# Patient Record
Sex: FEMALE | Race: WHITE | Employment: FULL TIME | URBAN - METROPOLITAN AREA
[De-identification: names, ages, dates, MRNs, and addresses within clinical notes are randomized per-mention and may not be internally consistent; named-entity substitution may affect disease eponyms.]

---

## 2017-11-21 PROBLEM — F90.0 ATTENTION DEFICIT HYPERACTIVITY DISORDER, INATTENTIVE TYPE: Status: ACTIVE | Noted: 2017-11-21

## 2020-08-21 ENCOUNTER — HOSPITAL ENCOUNTER (OUTPATIENT)
Dept: PHYSICAL THERAPY | Age: 25
Discharge: HOME | End: 2020-08-21
Payer: COMMERCIAL

## 2020-08-21 DIAGNOSIS — M54.50 ACUTE BILATERAL LOW BACK PAIN WITHOUT SCIATICA: ICD-10-CM

## 2020-08-21 PROCEDURE — 97162 PT EVAL MOD COMPLEX 30 MIN: CPT

## 2020-08-21 PROCEDURE — 97110 THERAPEUTIC EXERCISES: CPT

## 2020-08-21 NOTE — PROGRESS NOTES
Harjukuja 9  : 1995  Payor: Una Porras / [place]UE E[place]ENTIAL[place] MANISHA / Product Type: MANISHA /  Delfin Rape at [address]. [address] [place] [address], [facility], [address] [place] [address] Road  [phone]   [phone]                                                          Bushra Nascimento MD      OUTPATIENT PHYSICAL THERAPY: Daily Treatment Note 2020 Visit Count:  1    Tx Diagnosis:  Cervicalgia (M54.2)  Radiculopathy, Cervical Region (M54.12)  Pain in left hand (X91.290)  Low back pain (M54.5)  Radiculopathy, Lumbar Region (M54.16)  Sciatica, right side (M54.31)        Pre-treatment Symptoms/Complaints:  See Initial Eval Dated 20 for more details. Pain: Initial:4/10 Post Session: 0/10   Medications Last Reviewed:  2020     Updated Objective Findings: See Initial Eval for more details. TREATMENT:   THERAPEUTIC EXERCISE: (25 minutes):  Exercises per grid below to improve mobility, strength and balance. Required minimal visual, verbal and manual cues to promote proper body alignment and promote proper body posture. Progressed resistance and complexity of movement as indicated. Date:  2020 Date:   Date:     Activity/Exercise Parameters Parameters Parameters   Education HEP, POC, PT goals, anatomy/pathology. On whiplash, Pain Science, Pain sensitivity, 5 components of chronic pain, Graded exposure, Sleep hygiene, Nutrition, Water intake Aerobic exercise, Resistance training. Heart Rate intervals Deep breathing     Walk/jog interval training 12 min( 1 min Run 1min Walk 1R 1W 1R) two sets                                         THERAPEUTIC ACTIVITY: ( 0 minutes): Activities per gid below to improve functional movement related mobility, strength and balance to improve neuro-muscular carryover to daily functional activities for improving patient's quality of life.  Required visual, verbal and manual cues to promote proper body alignment and promote proper body posture/mechanics. Progressed resistance and complexity of movement as indicated. Date:  8/21/2020 Date:   Date:     Activity/Exercise Parameters Parameters Parameters                                                                               MANUAL THERAPY: (0 minutes): Joint mobilization, Soft tissue mobilization was utilized and necessary because of the patient's restricted joint motion and restricted motion of soft tissue mobility. Date  8/21/2020    Technique Used Grade  Level # Time(s) Effect while being performed                                                                 HEP Log Date 1.    8/21/2020   2.  8/21/2020   3. 8/21/2020   4.    5.           [employer] Portal  Treatment/Session Summary:    Response to Treatment: Pt demonstrated understanding of POC and initial HEP. No increase in pain or adverse reactions. Communication/Consultation:  POC, HEP, PT goals, Faxed initial evaluation to MD.   Equipment provided today: HEP Handout     Recommendations/Intent for next treatment session:   Next visit will focus on Flexion-based Exercises Extension-Based Exercises (EOTA) Manual Therapy Core Stability Hip strengthening soft tissue mobilization. Treatment Plan of Care Effective Dates: 8/21/2020 TO 10/20/2020 (60 days).   Frequency/Duration: 2 times a week for 60 Days             Total Treatment Billable Duration:   25  Rx plus Eval   PT Patient Time In/Time Out  Time In: 1325  Time Out: Waqas Sheriff [address] Miguel Ángel Vizcaino PT    Future Appointments   Date Time Provider Kassie House   9/10/2020 10:45 AM Yisel Rebolledo MD [facility]
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
## 2020-08-31 ENCOUNTER — HOSPITAL ENCOUNTER (OUTPATIENT)
Dept: PHYSICAL THERAPY | Age: 25
Discharge: HOME | End: 2020-08-31
Payer: COMMERCIAL

## 2020-08-31 PROCEDURE — 97110 THERAPEUTIC EXERCISES: CPT

## 2020-08-31 NOTE — PROGRESS NOTES
[address] 9  : 1995  Payor: Una Porras / [place] BLUE ESSENTIALS MANISHA / Product Type: MANISHA /  [address]. [address] [place] [address], [facility], [address] [place] [address] Road  [phone]   [phone]                                                          Bushra Nascimento MD      OUTPATIENT PHYSICAL THERAPY: Daily Treatment Note 2020 Visit Count:  2    Tx Diagnosis:  Cervicalgia (M54.2)  Radiculopathy, Cervical Region (M54.12)  Pain in left hand (K28.116)  Low back pain (M54.5)  Radiculopathy, Lumbar Region (M54.16)  Sciatica, right side (M54.31)        Pre-treatment Symptoms/Complaints:  See Initial Eval Dated 20 for more details. Pain: Initial:4/10 Post Session: 0/10   Medications Last Reviewed:  2020     Updated Objective Findings: See Initial Eval for more details. TREATMENT:   THERAPEUTIC EXERCISE: (49 minutes):  Exercises per grid below to improve mobility, strength and balance. Required minimal visual, verbal and manual cues to promote proper body alignment and promote proper body posture. Progressed resistance and complexity of movement as indicated. Date:  2020 Date:  2020 Date:     Activity/Exercise Parameters Parameters Parameters   Education HEP, POC, PT goals, anatomy/pathology. On whiplash, Pain Science, Pain sensitivity, 5 components of chronic pain, Graded exposure, Sleep hygiene, Nutrition, Water intake Aerobic exercise, Resistance training. Heart Rate intervals Deep breathing Education on deep breathing exercising and pacing her reps with her breathing to ease pain of exercises. Walk/jog interval training 12 min( 1 min Run 1min Walk 1R 1W 1R) two sets  16 min 1 mile   2-2 3.0-5.0    Rolling patterns  14 min    Rows  2 min    Cat-camel   4 min    Deep breathing Exercises  5 min    Lat pulldown  2 min          THERAPEUTIC ACTIVITY: ( 0 minutes):  Activities per gid below to improve functional movement related mobility, strength and balance to improve neuro-muscular carryover to daily functional activities for improving patient's quality of life. Required visual, verbal and manual cues to promote proper body alignment and promote proper body posture/mechanics. Progressed resistance and complexity of movement as indicated. Date:  8/31/2020 Date:   Date:     Activity/Exercise Parameters Parameters Parameters                                                                               MANUAL THERAPY: (0 minutes): Joint mobilization, Soft tissue mobilization was utilized and necessary because of the patient's restricted joint motion and restricted motion of soft tissue mobility. Date  8/31/2020    Technique Used Grade  Level # Time(s) Effect while being performed                                                                 HEP Log Date 1.    8/31/2020   2.  8/31/2020   3. 8/31/2020   4.    5.           [employer] Portal  Treatment/Session Summary:    Response to Treatment: Pt conitnued with mobility exercises and responded well to rolling patterns and had a reduction in symptoms. Communication/Consultation:  POC, HEP, PT goals, Faxed initial evaluation to MD.   Equipment provided today: HEP Handout     Recommendations/Intent for next treatment session:   Next visit will focus on Flexion-based Exercises Extension-Based Exercises (EOTA) Manual Therapy Core Stability Hip strengthening soft tissue mobilization. Treatment Plan of Care Effective Dates: 8/21/2020 TO 10/20/2020 (60 days).   Frequency/Duration: 2 times a week for 60 Days             Total Treatment Billable Duration:   49  Rx   PT Patient Time In/Time Out  Time In: 1400  Time Out: 500 [facility] Jimena Gaffney PT    Future Appointments   Date Time Provider Kassie House   9/2/2020 11:00 AM Mi Escobar PT [facility]   9/9/2020 11:00 AM Mi Escobar PT [facility]   9/10/2020 10:45 AM Michelle Nelson MD [facility]   9/11/2020  1:00 PM Mi Escobar, PT [facility]   9/14/2020  2:00 PM Mi Escobar, PT [facility]   9/16/2020  2:00 PM Mi Escobar, PT [facility]   9/21/2020  3:00 PM Mi Escobar, PT [facility]   9/23/2020  2:00 PM Mi Escobar, PT [facility]   9/28/2020  2:00 PM Mi Escobar, PT [facility]   9/30/2020  1:00 PM Mi Escobar, [facility]

## 2020-09-02 ENCOUNTER — HOSPITAL ENCOUNTER (OUTPATIENT)
Dept: PHYSICAL THERAPY | Age: 25
Discharge: HOME | End: 2020-09-02
Payer: COMMERCIAL

## 2020-09-02 PROCEDURE — 97110 THERAPEUTIC EXERCISES: CPT

## 2020-09-02 PROCEDURE — 97530 THERAPEUTIC ACTIVITIES: CPT

## 2020-09-02 NOTE — PROGRESS NOTES
Harjuku[address] 9  : 1995  Payor: Rosa Ibrahim / [place] BLUE ESSENTIALS MANISHA / Product Type: MANISHA /  Justyna Garcias at 4 [facility]. [address] [place] [address], [facility], [address] [place] [address] Road  [phone]   [phone]                                                          Marquis Padmini MD      OUTPATIENT PHYSICAL THERAPY: Daily Treatment Note 2020 Visit Count:  3    Tx Diagnosis:  Cervicalgia (M54.2)  Radiculopathy, Cervical Region (M54.12)  Pain in left hand (O82.475)  Low back pain (M54.5)  Radiculopathy, Lumbar Region (M54.16)  Sciatica, right side (M54.31)        Pre-treatment Symptoms/Complaints:  Pt rpeorts that she had mild back pain but she hasnt done her exercises. Pain: Initial:4/10 Post Session: 0/10   Medications Last Reviewed:  2020     Updated Objective Findings: See Initial Eval for more details. TREATMENT:   THERAPEUTIC EXERCISE: (45 minutes):  Exercises per grid below to improve mobility, strength and balance. Required minimal visual, verbal and manual cues to promote proper body alignment and promote proper body posture. Progressed resistance and complexity of movement as indicated. Date:  2020 Date:  2020 Date:  2020   Activity/Exercise Parameters Parameters Parameters   Education HEP, POC, PT goals, anatomy/pathology. On whiplash, Pain Science, Pain sensitivity, 5 components of chronic pain, Graded exposure, Sleep hygiene, Nutrition, Water intake Aerobic exercise, Resistance training. Heart Rate intervals Deep breathing Education on deep breathing exercising and pacing her reps with her breathing to ease pain of exercises.       Walk/jog interval training 12 min( 1 min Run 1min Walk 1R 1W 1R) two sets  16 min 1 mile   2-2 3.0-5.0  12 min 1 mile   3-2 3.0-5.0   Rolling patterns  14 min 15 min   Rows  2 min    Cat-camel   4 min 3 min   Deep breathing Exercises  5 min    Lat pulldown  2 min    crawling   5 min Lateral walking   4xs orange band   Monster walk   4xs orange band         THERAPEUTIC ACTIVITY: ( 0 minutes): Activities per gid below to improve functional movement related mobility, strength and balance to improve neuro-muscular carryover to daily functional activities for improving patient's quality of life. Required visual, verbal and manual cues to promote proper body alignment and promote proper body posture/mechanics. Progressed resistance and complexity of movement as indicated. Date:  8/31/2020 Date:  9/2/2020 Date:     Activity/Exercise Parameters Parameters Parameters   Deadlifts   10 min                                                           MANUAL THERAPY: (0 minutes): Joint mobilization, Soft tissue mobilization was utilized and necessary because of the patient's restricted joint motion and restricted motion of soft tissue mobility. Date  9/2/2020    Technique Used Grade Level # Time(s) Effect while being performed                                                                 HEP Log Date 1.    9/2/2020   2.  9/2/2020   3. 9/2/2020   4.    5.           [employer] Portal  Treatment/Session Summary:    Response to Treatment: Pt cotnineud with strengthening on hips and low back. Pt also stated on deadlift instruction. Communication/Consultation:  POC, HEP, PT goals, Faxed initial evaluation to MD.   Equipment provided today: HEP Handout     Recommendations/Intent for next treatment session:   Next visit will focus on Flexion-based Exercises Extension-Based Exercises (EOTA) Manual Therapy Core Stability Hip strengthening soft tissue mobilization. Treatment Plan of Care Effective Dates: 8/21/2020 TO 10/20/2020 (60 days).   Frequency/Duration: 2 times a week for 60 Days             Total Treatment Billable Duration:   55  Rx   PT Patient Time In/Time Out  Time In: 1103  Time Out: 651 [facility] Ninfa Membreno PT    Future Appointments   Date Time Provider Department [place]   9/9/2020 11:00 AM Otoole Minh, PT [facility]   9[facility]10[facility]2020 10:45 [facility]   9/11/2020  1:00 PM Otoole Minh, PT [facility]   9/14/2020  2:00 PM Otoole Minh, PT [facility]   9/16/2020  2:00 PM Otoole Minh, PT [facility]   9/21/2020  3:00 PM Otoole Minh, PT [facility]   9/23/2020  2:00 PM Otoole Minh, PT [facility]   9/28/2020  2:00 PM Otoole Minh, PT [facility]   9/30/2020  1:00 PM Otoole Minh, PT [facility] [place] [facility]

## 2020-09-09 ENCOUNTER — HOSPITAL ENCOUNTER (OUTPATIENT)
Dept: PHYSICAL THERAPY | Age: 25
Discharge: HOME | End: 2020-09-09
Payer: COMMERCIAL

## 2020-09-09 PROCEDURE — 97530 THERAPEUTIC ACTIVITIES: CPT

## 2020-09-09 PROCEDURE — 97110 THERAPEUTIC EXERCISES: CPT

## 2020-09-09 NOTE — PROGRESS NOTES
Har[address] 9  : 1995  Payor: Mitra Guajardo / [place] BLUE ESSENTIALS MANISHA / Product Type: MANISHA /  Te Perry at 4 [place]. [address], [facility], [id number] [place] [address]  [phone]   [phone]                                                          Jamal Thompson MD      OUTPATIENT PHYSICAL THERAPY: Daily Treatment Note 2020 Visit Count:  4    Tx Diagnosis:  Cervicalgia (M54.2)  Radiculopathy, Cervical Region (M54.12)  Pain in left hand (Q87.321)  Low back pain (M54.5)  Radiculopathy, Lumbar Region (M54.16)  Sciatica, right side (M54.31)        Pre-treatment Symptoms/Complaints:  Pt reports that her back is feeling better and she is getting through her work without pain. Pain: Initial:4/10 Post Session: 0/10   Medications Last Reviewed:  2020     Updated Objective Findings: See Initial Eval for more details. TREATMENT:   THERAPEUTIC EXERCISE: (30 minutes):  Exercises per grid below to improve mobility, strength and balance. Required minimal visual, verbal and manual cues to promote proper body alignment and promote proper body posture. Progressed resistance and complexity of movement as indicated. Date:  2020 Date:  2020 Date:  2020 Date  2020   Activity/Exercise Parameters Parameters Parameters    Education HEP, POC, PT goals, anatomy/pathology. On whiplash, Pain Science, Pain sensitivity, 5 components of chronic pain, Graded exposure, Sleep hygiene, Nutrition, Water intake Aerobic exercise, Resistance training. Heart Rate intervals Deep breathing Education on deep breathing exercising and pacing her reps with her breathing to ease pain of exercises.        Walk/jog interval training 12 min( 1 min Run 1min Walk 1R 1W 1R) two sets  16 min 1 mile   2-2 3.0-5.0  12 min 1 mile   3-2 3.0-5.0  12 min 1 mile   3-2 3.0-5.0   Rolling patterns  14 min 15 min 12 min   Rows  2 min     Cat-camel   4 min 3 min 3 min Deep breathing Exercises  5 min     Lat pulldown  2 min     crawling   5 min    Lateral walking   4xs orange band 4xs orange band   Monster walk   4xs orange band 4xs orange band         THERAPEUTIC ACTIVITY: ( 8 minutes): Activities per gid below to improve functional movement related mobility, strength and balance to improve neuro-muscular carryover to daily functional activities for improving patient's quality of life. Required visual, verbal and manual cues to promote proper body alignment and promote proper body posture/mechanics. Progressed resistance and complexity of movement as indicated. Date:  8/31/2020 Date:  9/2/2020 Date:  9/9/2020   Activity/Exercise Parameters Parameters Parameters   Deadlifts   10 min 5 sets of 5                                                    MANUAL THERAPY: (0 minutes): Joint mobilization, Soft tissue mobilization was utilized and necessary because of the patient's restricted joint motion and restricted motion of soft tissue mobility. Date  9/9/2020    Technique Used Grade Level # Time(s) Effect while being performed                                                                 HEP Log Date 1.    9/9/2020   2.  9/9/2020   3. 9/9/2020   4.    5.           [employer] Portal  Treatment/Session Summary:    Response to Treatment: Pt tolerated treatment well but felt ill at end of treatment. Illness was believed to be stomach related and treatment ended early   Communication/Consultation:  POC, HEP, PT goals, Faxed initial evaluation to MD.   Equipment provided today: HEP Handout     Recommendations/Intent for next treatment session:   Next visit will focus on Flexion-based Exercises Extension-Based Exercises (EOTA) Manual Therapy Core Stability Hip strengthening soft tissue mobilization. Treatment Plan of Care Effective Dates: 8/21/2020 TO 10/20/2020 (60 days).   Frequency/Duration: 2 times a week for 60 Days             Total Treatment Billable Duration:   40  Rx   PT Patient Time In/Time Out  Time In: 1115  Time Out: [address], PT    Future Appointments   Date Time Provider Kassie Lacy   9/10/2020 10:45 AM Harsh Bagley MD [facility]   9/11/2020  1:00 PM Levon Aye, PT [facility]   9/14/2020  2:00 PM Levon Aye, PT [facility]   9/16/2020  2:00 PM Levon Barton, PT [facility]   9/21/2020  3:00 PM Levon Aye, PT [facility]   9/23/2020  2:00 PM Levon Barton, PT [facility]   9/28/2020  2:00 PM Levon Aye, PT [facility]   9/30/2020  1:00 PM Levon Aye, PT [facility]

## 2020-09-11 ENCOUNTER — HOSPITAL ENCOUNTER (OUTPATIENT)
Dept: PHYSICAL THERAPY | Age: 25
Discharge: HOME | End: 2020-09-11
Payer: COMMERCIAL

## 2020-09-11 PROCEDURE — 97110 THERAPEUTIC EXERCISES: CPT

## 2020-09-11 PROCEDURE — 97530 THERAPEUTIC ACTIVITIES: CPT

## 2020-09-11 NOTE — PROGRESS NOTES
Har[address] 9  : 1995  Payor: Karthikeyan Danielson / [place] [employer] BLUE ESSENTIALS MANISHA / Product Type: MANISHA /  Rula Schaefer at 4 [facility]. [facility], [address] [place] [address], [address] [place] [address]  [phone]   [phone]                                                          Gem Portillo MD      OUTPATIENT PHYSICAL THERAPY: Daily Treatment Note 2020 Visit Count:  5    Tx Diagnosis:  Cervicalgia (M54.2)  Radiculopathy, Cervical Region (M54.12)  Pain in left hand (A75.998)  Low back pain (M54.5)  Radiculopathy, Lumbar Region (M54.16)  Sciatica, right side (M54.31)        Pre-treatment Symptoms/Complaints:  Pt reports that she is doing well and she has been trying to work out and she has no pain. Pain: Initial:10 Post Session: 0/10   Medications Last Reviewed:  2020     Updated Objective Findings: See Initial Eval for more details. TREATMENT:   THERAPEUTIC EXERCISE: (15 minutes):  Exercises per grid below to improve mobility, strength and balance. Required minimal visual, verbal and manual cues to promote proper body alignment and promote proper body posture. Progressed resistance and complexity of movement as indicated. Date:  2020 Date:  2020 Date:  2020 Date  2020 Date  2020   Activity/Exercise Parameters Parameters Parameters     Education HEP, POC, PT goals, anatomy/pathology. On whiplash, Pain Science, Pain sensitivity, 5 components of chronic pain, Graded exposure, Sleep hygiene, Nutrition, Water intake Aerobic exercise, Resistance training. Heart Rate intervals Deep breathing Education on deep breathing exercising and pacing her reps with her breathing to ease pain of exercises.         Walk/jog interval training 12 min( 1 min Run 1min Walk 1R 1W 1R) two sets  16 min 1 mile   2-2 3.0-5.0  12 min 1 mile   3-2 3.0-5.0  12 min 1 mile   3-2 3.0-5.0  12 min 1 mile   3-2 3.0-5.0   Rolling patterns  14 min 15 min 12 min 8 min Rows  2 min      Cat-camel   4 min 3 min 3 min 3 min   Deep breathing Exercises  5 min      Lat pulldown  2 min      crawling   5 min     Lateral walking   4xs orange band 4xs orange band 4xs blue band   Monster walk   4xs orange band 4xs orange band 4xs blue band   Clams two ways     12xs red band                                 THERAPEUTIC ACTIVITY: ( 47 minutes): Activities per gid below to improve functional movement related mobility, strength and balance to improve neuro-muscular carryover to daily functional activities for improving patient's quality of life. Required visual, verbal and manual cues to promote proper body alignment and promote proper body posture/mechanics. Progressed resistance and complexity of movement as indicated. Date:  8/31/2020 Date:  9/2/2020 Date:  9/9/2020   Activity/Exercise Parameters Parameters Parameters   Deadlifts   10 min 5 sets of 5    planks    30 seconds 3xs   Lats pulldowns    50 lbs 3x10   Chili pots    23 lbs 2 x10 each side   Circuit    3 rounds for time 10 ball slams 10 lunges  5 burpees   Total Time: 5:02                       MANUAL THERAPY: (0 minutes): Joint mobilization, Soft tissue mobilization was utilized and necessary because of the patient's restricted joint motion and restricted motion of soft tissue mobility. Date  9/11/2020    Technique Used Grade Level # Time(s) Effect while being performed                                                                 HEP Log Date 1.    9/11/2020   2.  9/11/2020   3. 9/11/2020   4.    5.           [employer] Portal  Treatment/Session Summary:    Response to Treatment: Pt progressed with core strengthening exercises to improve tolerance to activity. Pt had no pain with exercises but had mild soreness.    Communication/Consultation:  POC, HEP, PT goals, Faxed initial evaluation to MD.   Equipment provided today: HEP Handout     Recommendations/Intent for next treatment session:   Next visit will focus on Flexion-based Exercises Extension-Based Exercises (EOTA) Manual Therapy Core Stability Hip strengthening soft tissue mobilization. Treatment Plan of Care Effective Dates: 8/21/2020 TO 10/20/2020 (60 days).   Frequency/Duration: 2 times a week for 60 Days             Total Treatment Billable Duration:   62  Rx   PT Patient Time In/Time Out  Time In: 1301  Time Out: 451 [facility],     Future Appointments   Date Time Provider Kassie House   9/14/2020  2:00 PM Constanza Polo, PT [facility]   9/16/2020  2:00 PM Constanza Polo, PT [facility]   9/21/2020  3:00 PM Constanza Polo, PT [facility]   9/23/2020  2:00 PM Constanza Polo, PT [facility]   9/28/2020  2:00 PM Constanza Polo, PT [facility]   9/30/2020  1:00 PM Constanza Polo, PT [facility]

## 2020-09-14 ENCOUNTER — HOSPITAL ENCOUNTER (OUTPATIENT)
Dept: PHYSICAL THERAPY | Age: 25
Discharge: HOME | End: 2020-09-14
Payer: COMMERCIAL

## 2020-09-14 PROCEDURE — 97530 THERAPEUTIC ACTIVITIES: CPT

## 2020-09-14 PROCEDURE — 97110 THERAPEUTIC EXERCISES: CPT

## 2020-09-14 NOTE — PROGRESS NOTES
Har[address] 9  : 1995  Payor: Manuel Leonard / [place] BLUE ESSENTIALS MANISHA / Product Type: MANISHA /  [address] [facility] at [address] [place] [address]. [facility], [address] [place] [address], [address] [place] [address]  [phone]   [phone]                                                          Payal Curry MD      OUTPATIENT PHYSICAL THERAPY: Daily Treatment Note 2020 Visit Count:  6    Tx Diagnosis:  Cervicalgia (M54.2)  Radiculopathy, Cervical Region (M54.12)  Pain in left hand (V03.961)  Low back pain (M54.5)  Radiculopathy, Lumbar Region (M54.16)  Sciatica, right side (M54.31)        Pre-treatment Symptoms/Complaints:  Pt reports that her back has not been painful but her legs were sore. Pain: Initial:4/10 Post Session: 0/10   Medications Last Reviewed:  2020     Updated Objective Findings: See Initial Eval for more details. TREATMENT:   THERAPEUTIC EXERCISE: (15 minutes):  Exercises per grid below to improve mobility, strength and balance. Required minimal visual, verbal and manual cues to promote proper body alignment and promote proper body posture. Progressed resistance and complexity of movement as indicated. Date:  2020 Date:  2020 Date  2020 Date  2020 Date  2020   Activity/Exercise Parameters Parameters      Education Education on deep breathing exercising and pacing her reps with her breathing to ease pain of exercises.          Walk/jog interval training  16 min 1 mile   2-2 3.0-5.0  12 min 1 mile   3-2 3.0-5.0  12 min 1 mile   3-2 3.0-5.0  12 min 1 mile   3-2 3.0-5.0 12 min  4-3 3.0-5.0   Rolling patterns 14 min 15 min 12 min 8 min    Rows 2 min    47 lbs 3x10   Cat-camel  4 min 3 min 3 min 3 min 2 min   Deep breathing Exercises 5 min       Lat pulldown 2 min       crawling  5 min      Lateral walking  4xs orange band 4xs orange band 4xs blue band 4xs blue band   Monster walk  4xs orange band 4xs orange band 4xs blue band 4xs blue band   Clams two ways    12xs red band                                  THERAPEUTIC ACTIVITY: ( 38 minutes): Activities per gid below to improve functional movement related mobility, strength and balance to improve neuro-muscular carryover to daily functional activities for improving patient's quality of life. Required visual, verbal and manual cues to promote proper body alignment and promote proper body posture/mechanics. Progressed resistance and complexity of movement as indicated. Date:  8/31/2020 Date:  9/2/2020 Date:  9/9/2020 Date  9/11/2020 Date  9/14/2020   Activity/Exercise Parameters Parameters Parameters     Deadlifts   10 min 5 sets of 5   115 3x5   planks     30 seconds 3xs 45 seconds 3xs   Lats pulldowns     50 lbs 3x10 53 lbs 3x10   Chili pots     23 lbs 2 x10 each side 23 lbs 2 x10 each side   Circuit     3 rounds for time 10 ball slams 10 lunges  5 burpees   Total Time: 5:02    shuttle      175 3x10 jumping 30 seconds, mini jumps, skipping                  MANUAL THERAPY: (0 minutes): Joint mobilization, Soft tissue mobilization was utilized and necessary because of the patient's restricted joint motion and restricted motion of soft tissue mobility. Date  9/14/2020    Technique Used Grade Level # Time(s) Effect while being performed                                                                 HEP Log Date 1.    9/14/2020   2.  9/14/2020   3. 9/14/2020   4.    5.           [employer] Portal  Treatment/Session Summary:    Response to Treatment: Pt continued with core strengthening to improve back strength.  No pain with exercises and patient had decreased rest intervals   Communication/Consultation:  POC, HEP, PT goals, Faxed initial evaluation to MD.   Equipment provided today: HEP Handout     Recommendations/Intent for next treatment session:   Next visit will focus on Flexion-based Exercises Extension-Based Exercises (EOTA) Manual Therapy Core Stability Hip strengthening soft tissue mobilization. Treatment Plan of Care Effective Dates: 8/21/2020 TO 10/20/2020 (60 days).   Frequency/Duration: 2 times a week for 60 Days             Total Treatment Billable Duration:   53  Rx   PT Patient Time In/Time Out  Time In: 1400  Time Out: Star Pinon, PT    Future Appointments   Date Time Provider Kassie House   9/16/2020  2:00 PM [facility]   9/21/2020  3:00 PM [facility]   9/23/2020  2:00 PM Anastasia Lima, PT [facility]   9/28/2020  2:00 PM Anastasia Lima, PT [facility]   9/30/2020  1:00 PM Anastasia Lima, PT [facility]

## 2020-09-16 ENCOUNTER — HOSPITAL ENCOUNTER (OUTPATIENT)
Dept: PHYSICAL THERAPY | Age: 25
Discharge: HOME | End: 2020-09-16
Payer: COMMERCIAL

## 2020-09-16 PROCEDURE — 97530 THERAPEUTIC ACTIVITIES: CPT

## 2020-09-16 PROCEDURE — 97110 THERAPEUTIC EXERCISES: CPT

## 2020-09-16 NOTE — PROGRESS NOTES
Har[address] 9  : 1995  Payor: Lazara Corbett / [place] BLUE ESSENTIALS MANISHA / Product Type: MANISHA /  [address] at [address] [place]. [address] [facility] [place], [facility], [address] [place] [address] Road  [phone]   [phone]                                                          Precious Beaver MD      OUTPATIENT PHYSICAL THERAPY: Daily Treatment Note 2020 Visit Count:  7    Tx Diagnosis:  Cervicalgia (M54.2)  Radiculopathy, Cervical Region (M54.12)  Pain in left hand (S39.255)  Low back pain (M54.5)  Radiculopathy, Lumbar Region (M54.16)  Sciatica, right side (M54.31)        Pre-treatment Symptoms/Complaints:  Pt reports good adherenace to HEP but had mild pain lifting a tray at work. Pain: Initial:410 Post Session: 0/10   Medications Last Reviewed:  2020     Updated Objective Findings: See Initial Eval for more details. TREATMENT:   THERAPEUTIC EXERCISE: (40 minutes):  Exercises per grid below to improve mobility, strength and balance. Required minimal visual, verbal and manual cues to promote proper body alignment and promote proper body posture. Progressed resistance and complexity of movement as indicated.      Date:  2020 Date  2020 Date  2020 Date  2020 Date  2020   Activity/Exercise Parameters       Education        Walk/jog interval training  12 min 1 mile   3-2 3.0-5.0  12 min 1 mile   3-2 3.0-5.0  12 min 1 mile   3-2 3.0-5.0 12 min  4-3 3.0-5.0 15min  4-3 3.0-5.0   Rolling patterns 15 min 12 min 8 min  6 min   Rows    47 lbs 3x10 50 3x10s   Cat-camel  3 min 3 min 3 min 2 min 2 min   Deep breathing Exercises        Lat pulldown        crawling 5 min    4 min   Lateral walking 4xs orange band 4xs orange band 4xs blue band 4xs blue band 4xs blue band   Monster walk 4xs orange band 4xs orange band 4xs blue band 4xs blue band 4xs blue band   Clams two ways   12xs red band  10xs orange                                 THERAPEUTIC ACTIVITY: ( 17 minutes): Activities per gid below to improve functional movement related mobility, strength and balance to improve neuro-muscular carryover to daily functional activities for improving patient's quality of life. Required visual, verbal and manual cues to promote proper body alignment and promote proper body posture/mechanics. Progressed resistance and complexity of movement as indicated. Date:  8/31/2020 Date:  9/2/2020 Date:  9/9/2020 Date  9/11/2020 Date  9/14/2020 Date  9/16/2020   Activity/Exercise Parameters Parameters Parameters      Deadlifts   10 min 5 sets of 5   115 3x5 Holds 1 min 3xs   planks     30 seconds 3xs 45 seconds 3xs    Lats pulldowns     50 lbs 3x10 53 lbs 3x10 57 3x10   Chili pots     23 lbs 2 x10 each side 23 lbs 2 x10 each side    Circuit     3 rounds for time 10 ball slams 10 lunges  5 burpees   Total Time: 5:02     shuttle      175 3x10 jumping 30 seconds, mini jumps, skipping 25 lbs 3x10 with scissoring                   MANUAL THERAPY: (0 minutes): Joint mobilization, Soft tissue mobilization was utilized and necessary because of the patient's restricted joint motion and restricted motion of soft tissue mobility. Date  9/16/2020    Technique Used Grade Level # Time(s) Effect while being performed                                                                 HEP Log Date 1.    9/16/2020   2.  9/16/2020   3. 9/16/2020   4.    5.           [employer] Portal  Treatment/Session Summary:    Response to Treatment: Pt had mild complaints f holding a tray during work. Deadlifts were adjusted to  work on lumbar stability. No pain with exercises and imporved tolerance of activities.    Communication/Consultation:  POC, HEP, PT goals, Faxed initial evaluation to MD.   Equipment provided today: HEP Handout     Recommendations/Intent for next treatment session:   Next visit will focus on Flexion-based Exercises Extension-Based Exercises (EOTA) Manual Therapy Core Stability Hip strengthening soft tissue mobilization. Treatment Plan of Care Effective Dates: 8/21/2020 TO 10/20/2020 (60 days).   Frequency/Duration: 2 times a week for 60 Days             Total Treatment Billable Duration:   57  Rx   PT Patient Time In/Time Out  Time In: 4304  Time Out:   [facility], PT    Future Appointments   Date Time Provider Kassie House   9/21/2020  3:00 PM Caroline Rosenberg, PT [facility]   9/23/2020  2:00 PM Caroline Rosenberg, PT [facility]   9/28/2020  2:00 PM Caroline Rosenberg, PT [facility]   9/30/2020  1:00 PM Caroline Rosenberg, PT [facility]

## 2020-09-21 ENCOUNTER — HOSPITAL ENCOUNTER (OUTPATIENT)
Dept: PHYSICAL THERAPY | Age: 25
Discharge: HOME | End: 2020-09-21
Payer: COMMERCIAL

## 2020-09-21 NOTE — PROGRESS NOTES
Harjukuja 9  : 1995  Payor: Rosario Og / [place]HA / Product Type: MANISHA /  [address] [facility] at [address] [place] [address]. [address] [place] [address] [address] [place] [address], [facility], [address] [place] [address]  [phone]   [phone]        OUTPATIENT DAILY NOTE    NAME: Gabriella 9    DATE: 2020    Patient Cancelled physical therapy appointment today due to Car trouble. Will follow up next appointment.     Gina Booth, PT    Future Appointments   Date Time Provider Kassie House   2020  7:00 PM Phong Myles, PT [facility]   2020  2:00 PM Phong Myles, PT [facility]   2020  2:00 PM Phong Myles, PT [facility]   2020  1:00 PM Phong Myles, PT [facility]

## 2020-09-23 ENCOUNTER — HOSPITAL ENCOUNTER (OUTPATIENT)
Dept: PHYSICAL THERAPY | Age: 25
Discharge: HOME | End: 2020-09-23
Payer: COMMERCIAL

## 2020-09-23 PROCEDURE — 97530 THERAPEUTIC ACTIVITIES: CPT

## 2020-09-23 PROCEDURE — 97110 THERAPEUTIC EXERCISES: CPT

## 2020-09-23 NOTE — PROGRESS NOTES
Harjukuja 9  : 1995  Payor: Janna Campbell / [place] / Product Type: MANISHA /  2809 [facility] at [address] [place] [address]. Mariam Andrewssamantha., [address] [facility], [id number] [place] [address]  [phone]   [phone]                                                          Edith Oliva MD      OUTPATIENT PHYSICAL THERAPY: Daily Treatment Note 2020 Visit Count:  8    Tx Diagnosis:  Cervicalgia (M54.2)  Radiculopathy, Cervical Region (M54.12)  Pain in left hand (K94.907)  Low back pain (M54.5)  Radiculopathy, Lumbar Region (M54.16)  Sciatica, right side (M54.31)        Pre-treatment Symptoms/Complaints:  No complaints of pain and has a good week. Pain: Initial:4/10 Post Session: 0/10   Medications Last Reviewed:  2020     Updated Objective Findings: See Initial Eval for more details. TREATMENT:   THERAPEUTIC EXERCISE: (27 minutes):  Exercises per grid below to improve mobility, strength and balance. Required minimal visual, verbal and manual cues to promote proper body alignment and promote proper body posture. Progressed resistance and complexity of movement as indicated. Date  2020 Date  2020 Date  2020 Date  2020 Date  2020   Activity/Exercise        Education        Walk/jog interval training  12 min 1 mile   3-2 3.0-5.0  12 min 1 mile   3-2 3.0-5.0 12 min  4-3 3.0-5.0 15 min  4-3 3.0-5.0 15 min  4-3 3.0-5.0   Rolling patterns 12 min 8 min  6 min    Rows   47 lbs 3x10 50 3x10s 53 3x10   Cat-camel  3 min 3 min 2 min 2 min    Deep breathing Exercises        Lat pulldown        crawling    4 min    Lateral walking 4xs orange band 4xs blue band 4xs blue band 4xs blue band 4xs blue band   Monster walk 4xs orange band 4xs blue band 4xs blue band 4xs blue band 4xs blue band   Clams two ways  12xs red band  10xs orange                                  THERAPEUTIC ACTIVITY: ( 30 minutes):  Activities per gid below to improve functional movement related mobility, strength and balance to improve neuro-muscular carryover to daily functional activities for improving patient's quality of life. Required visual, verbal and manual cues to promote proper body alignment and promote proper body posture/mechanics. Progressed resistance and complexity of movement as indicated. Date:  9/9/2020 Date  9/11/2020 Date  9/14/2020 Date  9/16/2020 Date  9/23/2020   Activity/Exercise Parameters       Deadlifts 5 sets of 5   115 3x5 Holds 1 min 3xs 120 3x5   planks  30 seconds 3xs 45 seconds 3xs     Lats pulldowns  50 lbs 3x10 53 lbs 3x10 57 3x10 603x10   Chili pots  23 lbs 2 x10 each side 23 lbs 2 x10 each side     Circuit  3 rounds for time 10 ball slams 10 lunges  5 burpees   Total Time: 5:02   3 rounds for time 10 ball slams 10 lunges  5 burpees   Total Time: 3:39   shuttle   175 3x10 jumping 30 seconds, mini jumps, skipping 25 lbs 3x10 with scissoring 225 3x10   50lbs 3x10 scissoring                 MANUAL THERAPY: (0 minutes): Joint mobilization, Soft tissue mobilization was utilized and necessary because of the patient's restricted joint motion and restricted motion of soft tissue mobility. Date  9/23/2020    Technique Used Grade Level # Time(s) Effect while being performed                                                                 HEP Log Date 1.    9/23/2020   2.  9/23/2020   3. 9/23/2020   4.    5.           [employer] Portal  Treatment/Session Summary:    Response to Treatment: Pt progressed with exercises to improve core stability and lumbar strength. No pain with exercises and increased in heavy training exercises.    Communication/Consultation:  POC, HEP, PT goals, Faxed initial evaluation to MD.   Equipment provided today: HEP Handout     Recommendations/Intent for next treatment session:   Next visit will focus on Flexion-based Exercises Extension-Based Exercises (EOTA) Manual Therapy Core Stability Hip strengthening soft tissue mobilization. Treatment Plan of Care Effective Dates: 8/21/2020 TO 10/20/2020 (60 days).   Frequency/Duration: 2 times a week for 60 Days             Total Treatment Billable Duration:   57  Rx   PT Patient Time In/Time Out  Time In: 1401  Time Out: Marycarmen 70, PT    Future Appointments   Date Time Provider Kassie House   9/28/2020  2:00 PM Jarred De Jesus, PT [facility]   9/30/2020  1:00 PM Jarred De Jesus, PT [facility]

## 2020-09-28 ENCOUNTER — HOSPITAL ENCOUNTER (OUTPATIENT)
Dept: PHYSICAL THERAPY | Age: 25
Discharge: HOME | End: 2020-09-28
Payer: COMMERCIAL

## 2020-09-28 PROCEDURE — 97110 THERAPEUTIC EXERCISES: CPT

## 2020-09-28 PROCEDURE — 97530 THERAPEUTIC ACTIVITIES: CPT

## 2020-09-28 NOTE — PROGRESS NOTES
Harjukuja 9  : 1995  Payor: Winter Palacios / [place] MAN[place]HA / Product Type: MANISHA /  2809 [facility] at [address] [place] [address]. [facility], [address] [place] [address], [address] [place] [address]  [phone]   [phone]                                                          Maryam Stone MD      OUTPATIENT PHYSICAL THERAPY: Daily Treatment Note 2020 Visit Count:  9    Tx Diagnosis:  Cervicalgia (M54.2)  Radiculopathy, Cervical Region (M54.12)  Pain in left hand (W44.681)  Low back pain (M54.5)  Radiculopathy, Lumbar Region (M54.16)  Sciatica, right side (M54.31)        Pre-treatment Symptoms/Complaints:  Pt reports that she is doing well and only have mild discomfort at work that leaves if she justs moves. Pain: Initial:10 Post Session: 0/10   Medications Last Reviewed:  2020     Updated Objective Findings: See Initial Eval for more details. TREATMENT:   THERAPEUTIC EXERCISE: (30 minutes):  Exercises per grid below to improve mobility, strength and balance. Required minimal visual, verbal and manual cues to promote proper body alignment and promote proper body posture. Progressed resistance and complexity of movement as indicated.      Date  2020 Date  2020 Date  2020 Date  2020 Date  2020   Activity/Exercise        Education        Walk/jog interval training  12 min 1 mile   3-2 3.0-5.0 12 min  4-3 3.0-5.0 15 min  4-3 3.0-5.0 15 min  4-3 3.0-5.0 15 min  4-3 3.0-5.0   Rolling patterns 8 min  6 min  4 min   Rows  47 lbs 3x10 50 3x10s 53 3x10 53 x10   Cat-camel  3 min 2 min 2 min  2 min   Deep breathing Exercises        Lat pulldown        crawling   4 min     Lateral walking 4xs blue band 4xs blue band 4xs blue band 4xs blue band 4xs blue band   Monster walk 4xs blue band 4xs blue band 4xs blue band 4xs blue band 4xs blue band   Clams two ways 12xs red band  10xs orange  30xs orange band THERAPEUTIC ACTIVITY: ( 24 minutes): Activities per gid below to improve functional movement related mobility, strength and balance to improve neuro-muscular carryover to daily functional activities for improving patient's quality of life. Required visual, verbal and manual cues to promote proper body alignment and promote proper body posture/mechanics. Progressed resistance and complexity of movement as indicated. Date  9/11/2020 Date  9/14/2020 Date  9/16/2020 Date  9/23/2020 Date  9/28/2020   Activity/Exercise        Deadlifts  115 3x5 Holds 1 min 3xs 120 3x5    planks 30 seconds 3xs 45 seconds 3xs   2 min   Lats pulldowns 50 lbs 3x10 53 lbs 3x10 57 3x10 603x10 53 3x10   Chili pots 23 lbs 2 x10 each side 23 lbs 2 x10 each side   23 lbs 2 x10 each side   Circuit 3 rounds for time 10 ball slams 10 lunges  5 burpees   Total Time: 5:02   3 rounds for time 10 ball slams 10 lunges  5 burpees   Total Time: 3:39    shuttle  175 3x10 jumping 30 seconds, mini jumps, skipping 25 lbs 3x10 with scissoring 225 3x10   50lbs 3x10 scissoring 225 3x10   175 3x10   Bodyweight row     3x10         MANUAL THERAPY: (0 minutes): Joint mobilization, Soft tissue mobilization was utilized and necessary because of the patient's restricted joint motion and restricted motion of soft tissue mobility. Date  9/28/2020    Technique Used Grade Level # Time(s) Effect while being performed                                                                 HEP Log Date 1.    9/28/2020   2.  9/28/2020   3. 9/28/2020   4.    5.           [employer] Portal  Treatment/Session Summary:    Response to Treatment: Pt continued with mobility and strengthening. No pain with exercises but patient still has diffculty with core strengthening.    Communication/Consultation:  POC, HEP, PT goals, Faxed initial evaluation to MD.   Equipment provided today: HEP Handout     Recommendations/Intent for next treatment session:   Next visit will focus on Flexion-based Exercises Extension-Based Exercises (EOTA) Manual Therapy Core Stability Hip strengthening soft tissue mobilization. Treatment Plan of Care Effective Dates: 8/21/2020 TO 10/20/2020 (60 days).   Frequency/Duration: 2 times a week for 60 Days             Total Treatment Billable Duration:   54  Rx   PT Patient Time In/Time Out  Time In: 4389  Time Out: 243 Darius Hinton PT    Future Appointments   Date Time Provider Kassie House   9/30/2020  1:00 PM Ila Pedro PT [facility]

## 2020-09-30 ENCOUNTER — HOSPITAL ENCOUNTER (OUTPATIENT)
Dept: PHYSICAL THERAPY | Age: 25
Discharge: HOME | End: 2020-09-30
Payer: COMMERCIAL

## 2020-09-30 PROCEDURE — 97110 THERAPEUTIC EXERCISES: CPT

## 2020-09-30 PROCEDURE — 97530 THERAPEUTIC ACTIVITIES: CPT

## 2020-09-30 NOTE — PROGRESS NOTES
Harjukuja 9  : 1995  Payor: Alla Eugene / [place] MAN[place]HA / Product Type: MANISHA /  [address] at [facility]. [address] [facility] [address], [facility], [address] [place] [address] Road  [phone]   [phone]                                                          Jostin Webster MD      OUTPATIENT PHYSICAL THERAPY: Daily Treatment Note 2020 Visit Count:  10    Tx Diagnosis:  Cervicalgia (M54.2)  Radiculopathy, Cervical Region (M54.12)  Pain in left hand (B67.447)  Low back pain (M54.5)  Radiculopathy, Lumbar Region (M54.16)  Sciatica, right side (M54.31)        Pre-treatment Symptoms/Complaints:  Please See Discharge Summary dated 20 for more details. Pain: Initial:0/10 Post Session: 0/10   Medications Last Reviewed:  2020     Updated Objective Findings: Please See Discharge Summary dated 20 for more details        TREATMENT:   THERAPEUTIC EXERCISE: (27 minutes):  Exercises per grid below to improve mobility, strength and balance. Required minimal visual, verbal and manual cues to promote proper body alignment and promote proper body posture. Progressed resistance and complexity of movement as indicated. Date  2020 Date  2020 Date  2020 Date  2020 Date  2020   Activity/Exercise        Education        Walk/jog interval training 12 min  4-3 3.0-5.0 15 min  4-3 3.0-5.0 15 min  4-3 3.0-5.0 15 min  4-3 3.0-5.0 15 min  4-3 3.0-5.0   Rolling patterns  6 min  4 min    Rows 47 lbs 3x10 50 3x10s 53 3x10 53 x10  53 3x10   Cat-camel  2 min 2 min  2 min    Deep breathing Exercises        Lat pulldown        crawling  4 min      Lateral walking 4xs blue band 4xs blue band 4xs blue band 4xs blue band 4xs blue band   Monster walk 4xs blue band 4xs blue band 4xs blue band 4xs blue band 4xs blue band   Clams two ways  10xs orange  30xs orange band                                  THERAPEUTIC ACTIVITY: ( 30 minutes):  Activities per gid below to improve functional movement related mobility, strength and balance to improve neuro-muscular carryover to daily functional activities for improving patient's quality of life. Required visual, verbal and manual cues to promote proper body alignment and promote proper body posture/mechanics. Progressed resistance and complexity of movement as indicated. Date  9/14/2020 Date  9/16/2020 Date  9/23/2020 Date  9/28/2020 Date  9/30/2020   Activity/Exercise        Deadlifts 115 3x5 Holds 1 min 3xs 120 3x5     planks 45 seconds 3xs   2 min 2 min   Lats pulldowns 53 lbs 3x10 57 3x10 603x10 53 3x10 53 3x10   Chili pots 23 lbs 2 x10 each side   23 lbs 2 x10 each side    Circuit   3 rounds for time 10 ball slams 10 lunges  5 burpees   Total Time: 3:39  3 rounds for time 10 ball slams 10 lunges  5 burpees   Total Time: 3:12   shuttle 175 3x10 jumping 30 seconds, mini jumps, skipping 25 lbs 3x10 with scissoring 225 3x10   50lbs 3x10 scissoring 225 3x10   175 3x10 225 3x10   175 3x10   Bodyweight row    3x10 3x10         MANUAL THERAPY: (0 minutes): Joint mobilization, Soft tissue mobilization was utilized and necessary because of the patient's restricted joint motion and restricted motion of soft tissue mobility. Date  9/30/2020    Technique Used Grade Level # Time(s) Effect while being performed                                                                 HEP Log Date 1.    9/30/2020   2.  9/30/2020   3. 9/30/2020   4.    5.           [employer] Portal  Treatment/Session Summary:    Response to Treatment: Please see Discharge Summary dated 9.30.20 for more details. Communication/Consultation:  Faxed Discharge Summary to MD.   Equipment provided today: HEP Handout           Total Treatment Billable Duration:   54  Rx   PT Patient Time In/Time Out  Time In: 1301  Time Out: [address], PT    No future appointments.

## 2022-03-19 PROBLEM — F90.0 ATTENTION DEFICIT HYPERACTIVITY DISORDER, INATTENTIVE TYPE: Status: ACTIVE | Noted: 2017-11-21

## 2022-03-21 ENCOUNTER — NURSE TRIAGE (OUTPATIENT)
Dept: OTHER | Facility: CLINIC | Age: 27
End: 2022-03-21

## 2022-03-21 NOTE — TELEPHONE ENCOUNTER
Received call from [facility] with Red Flag Complaint. Current Symptoms: Productive cough with yellow sputum, sore throat, nasal drainage    Denies - nausea / vomiting / diarrhea / wheeze / shortness of breath    Onset: 10 days ago; gradual    Pain Severity: 2/10     Temperature: Denies     What has been tried: benadryl, excedrin      Recommended disposition: See in Office Today or Tomorrow Triage RN advised patient that if they cannot be seen in the office today or tomorrow to go to an [address] [place], [place] [address]e. Care advice provided, patient verbalizes understanding; denies any other questions or concerns; instructed to call back for any new or worsening symptoms. Patient/Caller agrees with recommended disposition; writer provided warm transfer to [facility] for appointment scheduling    Attention Provider: Thank you for allowing me to participate in the care of your patient. The patient was connected to triage in response to information provided to the ECC. Please do not respond through this encounter as the response is not directed to a shared pool.       Reason for Disposition   Patient wants to be seen    Protocols used: [id number]

## 2022-06-20 DIAGNOSIS — F90.0 ATTENTION DEFICIT HYPERACTIVITY DISORDER, INATTENTIVE TYPE: Primary | ICD-10-CM

## 2022-08-16 DIAGNOSIS — F41.9 ANXIETY DISORDER, UNSPECIFIED TYPE: Primary | ICD-10-CM

## 2022-08-16 RX ORDER — LORAZEPAM 1 MG/1
1 TABLET ORAL DAILY
Qty: 30 TABLET | Refills: 0 | Status: SHIPPED | OUTPATIENT
Start: 2022-08-16 | End: 2022-09-15

## 2022-08-16 RX ORDER — LISDEXAMFETAMINE DIMESYLATE 60 MG/1
60 CAPSULE ORAL DAILY
Qty: 30 CAPSULE | Refills: 0 | Status: CANCELLED | OUTPATIENT
Start: 2022-08-16 | End: 2022-09-15

## 2022-08-16 NOTE — TELEPHONE ENCOUNTER
Patient needs her Vyvanse and her Lorazepam refilled and sent to [facility]. I made her an VV appt.  on 8/24

## 2022-08-24 ENCOUNTER — TELEMEDICINE (OUTPATIENT)
Dept: FAMILY MEDICINE CLINIC | Facility: CLINIC | Age: 27
End: 2022-08-24

## 2022-08-24 DIAGNOSIS — F90.0 ATTENTION DEFICIT HYPERACTIVITY DISORDER, INATTENTIVE TYPE: ICD-10-CM

## 2022-08-24 DIAGNOSIS — F41.9 ANXIETY DISORDER, UNSPECIFIED TYPE: ICD-10-CM

## 2022-08-24 DIAGNOSIS — L70.0 ACNE VULGARIS: Primary | ICD-10-CM

## 2022-08-24 PROCEDURE — 99213 OFFICE O/P EST LOW 20 MIN: CPT | Performed by: FAMILY MEDICINE

## 2022-08-24 RX ORDER — CLINDAMYCIN PHOSPHATE 10 MG/G
GEL TOPICAL
Qty: 60 G | Refills: 2 | Status: SHIPPED | OUTPATIENT
Start: 2022-08-24

## 2022-08-24 RX ORDER — LISDEXAMFETAMINE DIMESYLATE 60 MG/1
60 CAPSULE ORAL DAILY
Qty: 30 CAPSULE | Refills: 0 | Status: SHIPPED | OUTPATIENT
Start: 2022-09-23 | End: 2022-10-23

## 2022-08-24 ASSESSMENT — ENCOUNTER SYMPTOMS
CHEST TIGHTNESS: 0
CONSTIPATION: 0
SHORTNESS OF BREATH: 0
EYE DISCHARGE: 0
SINUS PAIN: 0
ABDOMINAL PAIN: 0
DIARRHEA: 0
COUGH: 0

## 2022-08-24 NOTE — PROGRESS NOTES
2022    TELEHEALTH EVALUATION -- Audio/Visual [facility])    HPI:    Ivet Pratt (:  1995) has requested an audio/video evaluation for the following concern(s):    Here for recheck of ADD. Doing well on current medication. Sleeping well. Good appetite. Not jittery. No CP or palpitations. Focus has been good. Work doing well. 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 No headaches. Mood has been good. Clindamycin gel helping for stress related acne breakouts. Review of Systems   Constitutional:  Negative for appetite change, fatigue and fever. HENT:  Negative for congestion, ear pain and sinus pain. Eyes:  Negative for discharge. Respiratory:  Negative for cough, chest tightness and shortness of breath. Cardiovascular:  Negative for chest pain, palpitations and leg swelling. Gastrointestinal:  Negative for abdominal pain, constipation and diarrhea. Genitourinary:  Negative for dysuria. Musculoskeletal:  Negative for joint swelling. Skin:  Negative for rash. Neurological:  Negative for headaches. Hematological:  Negative for adenopathy. Psychiatric/Behavioral:  Negative for dysphoric mood. The patient is not nervous/anxious. Prior to Visit Medications    Medication Sig Taking? Authorizing Provider   Lisdexamfetamine Dimesylate 60 MG CAPS Take 60 mg by mouth daily for 30 days. Yes Pa Jiang MD   Lisdexamfetamine Dimesylate 60 MG CAPS Take 60 mg by mouth daily for 30 days. Yes Pa Jiang MD   Lisdexamfetamine Dimesylate (VYVANSE) 60 MG CAPS Take 60 mg by mouth daily for 30 days. Yes Pa Jiang MD   clindamycin (CLINDAGEL) 1 % gel Use thin film on affected area twice a day Yes Pa Jiang MD   LORazepam (ATIVAN) 1 MG tablet Take 1 tablet by mouth before bedtime for 30 days.   Pa Jiang MD   busPIRone (BUSPAR) 10 MG tablet Take 10 mg by mouth 2 times daily  Ar Automatic Reconciliation clotrimazole-betamethasone (LOTRISONE) 1-0.05 % cream Apply topically 2 times daily  Ar Automatic Reconciliation   diclofenac (VOLTAREN) 75 MG EC tablet Take 75 mg by mouth 2 times daily  Ar Automatic Reconciliation   fluconazole (DIFLUCAN) 150 MG tablet Take 150 mg by mouth daily  Ar Automatic Reconciliation   norgestrel-ethinyl estradiol (LO/OVRAL) 0.3-30 MG-MCG per tablet Take 1 tablet by mouth daily  Ar Automatic Reconciliation       Social History     Tobacco Use    Smoking status: Former     Types: Cigarettes     Quit date: 2017     Years since quittin.9    Smokeless tobacco: Never    Tobacco comments:     Quit smoking: once monthly   Substance Use Topics    Alcohol use: Yes    Drug use: No        Not on File,   Past Medical History:   Diagnosis Date    Acne     ADD (attention deficit disorder)     Anxiety    ,   Past Surgical History:   Procedure Laterality Date    WISDOM TOOTH EXTRACTION  8-26-15   ,   Social History     Tobacco Use    Smoking status: Former     Types: Cigarettes     Quit date: 2017     Years since quittin.9    Smokeless tobacco: Never    Tobacco comments:     Quit smoking: once monthly   Substance Use Topics    Alcohol use: Yes    Drug use: No   ,   Family History   Problem Relation Age of Onset    No Known Problems Father     No Known Problems Mother        PHYSICAL EXAMINATION:  [ INSTRUCTIONS:  \"[x]\" Indicates a positive item  \"[]\" Indicates a negative item  -- DELETE ALL ITEMS NOT EXAMINED]  Vital Signs: (As obtained by patient/caregiver or practitioner observation)    Blood pressure-  Heart rate-    Respiratory rate-    Temperature-  Pulse oximetry-     Constitutional: [x] Appears well-developed and well-nourished [] No apparent distress      [] Abnormal-   Mental status  [x] Alert and awake  [x] Oriented to person/place/time []Able to follow commands      Eyes:  EOM    [x]  Normal  [] Abnormal-  Sclera  [x]  Normal  [] Abnormal -         Discharge []  None visible [] Abnormal -    HENT:   [x] Normocephalic, atraumatic. [] Abnormal   [] Mouth/Throat: Mucous membranes are moist.     External Ears [] Normal  [] Abnormal-     Neck: [] No visualized mass     Pulmonary/Chest: [x] Respiratory effort normal.  [x] No visualized signs of difficulty breathing or respiratory distress        [] Abnormal-      Musculoskeletal:   [] Normal gait with no signs of ataxia         [] Normal range of motion of neck        [] Abnormal-       Neurological:        [x] No Facial Asymmetry (Cranial nerve 7 motor function) (limited exam to video visit)          [] No gaze palsy        [] Abnormal-         Skin:        [x] No significant exanthematous lesions or discoloration noted on facial skin         [] Abnormal-            Psychiatric:       [x] Normal Affect [] No Hallucinations        [] Abnormal-     Other pertinent observable physical exam findings-     ASSESSMENT/PLAN:  1. Anxiety disorder, unspecified type  Doing well  2. Attention deficit hyperactivity disorder, inattentive type  Cont current dosages  - Lisdexamfetamine Dimesylate 60 MG CAPS; Take 60 mg by mouth daily for 30 days. Dispense: 30 capsule; Refill: 0  - Lisdexamfetamine Dimesylate 60 MG CAPS; Take 60 mg by mouth daily for 30 days. Dispense: 30 capsule; Refill: 0  - Lisdexamfetamine Dimesylate (VYVANSE) 60 MG CAPS; Take 60 mg by mouth daily for 30 days. Dispense: 30 capsule; Refill: 0    3. Acne vulgaris    - clindamycin (CLINDAGEL) 1 % gel; Use thin film on affected area twice a day  Dispense: 60 g; Refill: 2    Return in about 3 months (around 11/24/2022) for recheck. [address] [facility], was evaluated through a synchronous (real-time) audio-video encounter. The patient (or guardian if applicable) is aware that this is a billable service, which includes applicable co-pays. This Virtual Visit was conducted with patient's (and/or legal guardian's) consent.  The visit was conducted pursuant to the emergency declaration under the [facility], [address] [place] [address] authority and the [employer] and [facility] General Act. Patient identification was verified, and a caregiver was present when appropriate. The patient was located at Home: [facility]. Provider was located at [facility] (Appt Dept): [address]. Total time spent on this encounter:  20min    --Naa Beverly MD on 8/24/2022 at 7:30 AM    An electronic signature was used to authenticate this note.

## 2023-02-02 ENCOUNTER — TELEPHONE (OUTPATIENT)
Dept: FAMILY MEDICINE CLINIC | Facility: CLINIC | Age: 28
End: 2023-02-02

## 2023-02-02 NOTE — TELEPHONE ENCOUNTER
Vyvanse to be sent to [facility]. Patient has an appointment next Thursday but is out of medication.

## 2023-02-07 NOTE — TELEPHONE ENCOUNTER
Per Dr Giselle Taveras, pt has been notified via [employer] that OV is required for control refills. Pt has appt 2/9/23.

## 2023-02-09 ENCOUNTER — TELEMEDICINE (OUTPATIENT)
Dept: FAMILY MEDICINE CLINIC | Facility: CLINIC | Age: 28
End: 2023-02-09

## 2023-02-09 DIAGNOSIS — L70.0 ACNE VULGARIS: ICD-10-CM

## 2023-02-09 DIAGNOSIS — F90.0 ATTENTION DEFICIT HYPERACTIVITY DISORDER, INATTENTIVE TYPE: Primary | ICD-10-CM

## 2023-02-09 DIAGNOSIS — F51.01 PRIMARY INSOMNIA: ICD-10-CM

## 2023-02-09 PROCEDURE — 99213 OFFICE O/P EST LOW 20 MIN: CPT | Performed by: FAMILY MEDICINE

## 2023-02-09 RX ORDER — DEXTROAMPHETAMINE SACCHARATE, AMPHETAMINE ASPARTATE, DEXTROAMPHETAMINE SULFATE AND AMPHETAMINE SULFATE 5; 5; 5; 5 MG/1; MG/1; MG/1; MG/1
20 TABLET ORAL 2 TIMES DAILY
Qty: 60 TABLET | Refills: 0 | Status: SHIPPED | OUTPATIENT
Start: 2023-03-10 | End: 2023-04-09

## 2023-02-09 RX ORDER — HYDROXYZINE HYDROCHLORIDE 25 MG/1
25 TABLET, FILM COATED ORAL NIGHTLY
Qty: 30 TABLET | Refills: 5 | Status: SHIPPED | OUTPATIENT
Start: 2023-02-09 | End: 2023-03-11

## 2023-02-09 RX ORDER — DEXTROAMPHETAMINE SACCHARATE, AMPHETAMINE ASPARTATE, DEXTROAMPHETAMINE SULFATE AND AMPHETAMINE SULFATE 5; 5; 5; 5 MG/1; MG/1; MG/1; MG/1
20 TABLET ORAL 2 TIMES DAILY
Qty: 60 TABLET | Refills: 0 | Status: SHIPPED | OUTPATIENT
Start: 2023-02-09 | End: 2023-03-11

## 2023-02-09 RX ORDER — DEXTROAMPHETAMINE SACCHARATE, AMPHETAMINE ASPARTATE, DEXTROAMPHETAMINE SULFATE AND AMPHETAMINE SULFATE 5; 5; 5; 5 MG/1; MG/1; MG/1; MG/1
20 TABLET ORAL 2 TIMES DAILY
Qty: 60 TABLET | Refills: 0 | Status: SHIPPED | OUTPATIENT
Start: 2023-04-10 | End: 2023-05-10

## 2023-02-09 RX ORDER — CLINDAMYCIN PHOSPHATE 10 MG/G
GEL TOPICAL
Qty: 60 G | Refills: 2 | Status: SHIPPED | OUTPATIENT
Start: 2023-02-09

## 2023-02-09 SDOH — ECONOMIC STABILITY: FOOD INSECURITY: WITHIN THE PAST 12 MONTHS, THE FOOD YOU BOUGHT JUST DIDN'T LAST AND YOU DIDN'T HAVE MONEY TO GET MORE.: NEVER TRUE

## 2023-02-09 SDOH — ECONOMIC STABILITY: INCOME INSECURITY: HOW HARD IS IT FOR YOU TO PAY FOR THE VERY BASICS LIKE FOOD, HOUSING, MEDICAL CARE, AND HEATING?: NOT HARD AT ALL

## 2023-02-09 SDOH — ECONOMIC STABILITY: HOUSING INSECURITY
IN THE LAST 12 MONTHS, WAS THERE A TIME WHEN YOU DID NOT HAVE A STEADY PLACE TO SLEEP OR SLEPT IN A SHELTER (INCLUDING NOW)?: NO

## 2023-02-09 SDOH — ECONOMIC STABILITY: FOOD INSECURITY: WITHIN THE PAST 12 MONTHS, YOU WORRIED THAT YOUR FOOD WOULD RUN OUT BEFORE YOU GOT MONEY TO BUY MORE.: NEVER TRUE

## 2023-02-09 ASSESSMENT — PATIENT HEALTH QUESTIONNAIRE - PHQ9
1. LITTLE INTEREST OR PLEASURE IN DOING THINGS: 0
SUM OF ALL RESPONSES TO PHQ9 QUESTIONS 1 & 2: 0
SUM OF ALL RESPONSES TO PHQ QUESTIONS 1-9: 0
SUM OF ALL RESPONSES TO PHQ QUESTIONS 1-9: 0
2. FEELING DOWN, DEPRESSED OR HOPELESS: 0
SUM OF ALL RESPONSES TO PHQ QUESTIONS 1-9: 0
SUM OF ALL RESPONSES TO PHQ QUESTIONS 1-9: 0

## 2023-02-09 ASSESSMENT — ENCOUNTER SYMPTOMS
SINUS PAIN: 0
EYE DISCHARGE: 0
CHEST TIGHTNESS: 0
DIARRHEA: 0
ABDOMINAL PAIN: 0
COUGH: 0
CONSTIPATION: 0
SHORTNESS OF BREATH: 0

## 2023-02-09 NOTE — PROGRESS NOTES
2023    TELEHEALTH EVALUATION -- Audio/Visual (During AEJSE-16 public health emergency)    HPI:    Tom Erazo (:  1995) has requested an audio/video evaluation for the following concern(s):    Here for recheck of ADD. Doing well on current medication, but unable to afford Vyvanse. Sleeping well. Good appetite. Not jittery. No CP or palpitations. Focus has been good. Work doing well. No headaches. Mood has been good. Review of Systems   Constitutional:  Negative for appetite change, fatigue and fever. HENT:  Negative for congestion, ear pain and sinus pain. Eyes:  Negative for discharge. Respiratory:  Negative for cough, chest tightness and shortness of breath. Cardiovascular:  Negative for chest pain, palpitations and leg swelling. Gastrointestinal:  Negative for abdominal pain, constipation and diarrhea. Genitourinary:  Negative for dysuria. Musculoskeletal:  Negative for joint swelling. Skin:  Negative for rash. Neurological:  Negative for headaches. Hematological:  Negative for adenopathy. Psychiatric/Behavioral:  Negative for dysphoric mood. The patient is not nervous/anxious. Prior to Visit Medications    Medication Sig Taking? Authorizing Provider   amphetamine-dextroamphetamine (ADDERALL, 20MG,) 20 MG tablet Take 1 tablet by mouth 2 times daily for 30 days. Max Daily Amount: 40 mg Yes Maricarmen Winkler MD   amphetamine-dextroamphetamine (ADDERALL, 20MG,) 20 MG tablet Take 1 tablet by mouth 2 times daily for 30 days. Max Daily Amount: 40 mg Yes Maricarmen Winkler MD   amphetamine-dextroamphetamine (ADDERALL, 20MG,) 20 MG tablet Take 1 tablet by mouth 2 times daily for 30 days.  Max Daily Amount: 40 mg Yes Maricarmen Winkler MD   clindamycin (CLINDAGEL) 1 % gel Use thin film on affected area twice a day Yes Maricarmen Winkler MD   norgestrel-ethinyl estradiol (LO/OVRAL) 0.3-30 MG-MCG per tablet Take 1 tablet by mouth daily Yes Maricarmen Winkler MD hydrOXYzine HCl (ATARAX) 25 MG tablet Take 1 tablet by mouth nightly Yes Tanisha Roman MD   Lisdexamfetamine Dimesylate 60 MG CAPS Take 60 mg by mouth daily for 30 days. Tanisha Roman MD   Lisdexamfetamine Dimesylate 60 MG CAPS Take 60 mg by mouth daily for 30 days. Tanisha Roman MD   Lisdexamfetamine Dimesylate (VYVANSE) 60 MG CAPS Take 60 mg by mouth daily for 30 days.   Tanisha Roman MD   busPIRone (BUSPAR) 10 MG tablet Take 10 mg by mouth 2 times daily  Ar Automatic Reconciliation   clotrimazole-betamethasone (LOTRISONE) 1-0.05 % cream Apply topically 2 times daily  Ar Automatic Reconciliation   diclofenac (VOLTAREN) 75 MG EC tablet Take 75 mg by mouth 2 times daily  Ar Automatic Reconciliation   fluconazole (DIFLUCAN) 150 MG tablet Take 150 mg by mouth daily  Ar Automatic Reconciliation       Social History     Tobacco Use    Smoking status: Former     Types: Cigarettes     Quit date: 2017     Years since quittin.4    Smokeless tobacco: Never    Tobacco comments:     Quit smoking: once monthly   Substance Use Topics    Alcohol use: Yes    Drug use: No        Not on File,   Past Medical History:   Diagnosis Date    Acne     ADD (attention deficit disorder)     Anxiety    ,   Past Surgical History:   Procedure Laterality Date    WISDOM TOOTH EXTRACTION  8-26-15   ,   Social History     Tobacco Use    Smoking status: Former     Types: Cigarettes     Quit date: 2017     Years since quittin.4    Smokeless tobacco: Never    Tobacco comments:     Quit smoking: once monthly   Substance Use Topics    Alcohol use: Yes    Drug use: No   ,   Family History   Problem Relation Age of Onset    No Known Problems Father     No Known Problems Mother        PHYSICAL EXAMINATION:  [ INSTRUCTIONS:  \"[x]\" Indicates a positive item  \"[]\" Indicates a negative item  -- DELETE ALL ITEMS NOT EXAMINED]  Vital Signs: (As obtained by patient/caregiver or practitioner observation)    Blood pressure- Heart rate-    Respiratory rate-    Temperature-  Pulse oximetry-     Constitutional: [x] Appears well-developed and well-nourished [x] No apparent distress      [] Abnormal-   Mental status  [x] Alert and awake  [x] Oriented to person/place/time []Able to follow commands      Eyes:  EOM    [x]  Normal  [] Abnormal-  Sclera  []  Normal  [] Abnormal -         Discharge []  None visible  [] Abnormal -    HENT:   [] Normocephalic, atraumatic. [] Abnormal   [] Mouth/Throat: Mucous membranes are moist.     External Ears [] Normal  [] Abnormal-     Neck: [] No visualized mass     Pulmonary/Chest: [] Respiratory effort normal.  [] No visualized signs of difficulty breathing or respiratory distress        [] Abnormal-      Musculoskeletal:   [] Normal gait with no signs of ataxia         [] Normal range of motion of neck        [] Abnormal-       Neurological:        [x] No Facial Asymmetry (Cranial nerve 7 motor function) (limited exam to video visit)          [] No gaze palsy        [] Abnormal-         Skin:        [x] No significant exanthematous lesions or discoloration noted on facial skin         [] Abnormal-            Psychiatric:       [x] Normal Affect [] No Hallucinations        [] Abnormal-     Other pertinent observable physical exam findings-     ASSESSMENT/PLAN:  1. Acne vulgaris    - clindamycin (CLINDAGEL) 1 % gel; Use thin film on affected area twice a day  Dispense: 60 g; Refill: 2    2. Attention deficit hyperactivity disorder, inattentive type  Trial adderall  - amphetamine-dextroamphetamine (ADDERALL, 20MG,) 20 MG tablet; Take 1 tablet by mouth 2 times daily for 30 days. Max Daily Amount: 40 mg  Dispense: 60 tablet; Refill: 0  - amphetamine-dextroamphetamine (ADDERALL, 20MG,) 20 MG tablet; Take 1 tablet by mouth 2 times daily for 30 days. Max Daily Amount: 40 mg  Dispense: 60 tablet; Refill: 0  - amphetamine-dextroamphetamine (ADDERALL, 20MG,) 20 MG tablet;  Take 1 tablet by mouth 2 times daily for 30 days. Max Daily Amount: 40 mg  Dispense: 60 tablet; Refill: 0    3. Primary insomnia  Add Hydroxyzine    Schedule pap in 3 mo  OCP refilled    No follow-ups on file. [address], was evaluated through a synchronous (real-time) audio-video encounter. The patient (or guardian if applicable) is aware that this is a billable service, which includes applicable co-pays. This Virtual Visit was conducted with patient's (and/or legal guardian's) consent. The visit was conducted pursuant to the emergency declaration under the [facility], [address] [place] [address] waiver authority and the [employer] Act. Patient identification was verified, and a caregiver was present when appropriate. The patient was located at Home: [address] [place] [address]  Provider was located at [facility] ([address] [place] [address]): [phone] [address],  [address] [place] [address] Drive        Total time spent on this encounter:  20 min    --Payal Posey MD on 2/9/2023 at 2:53 PM    An electronic signature was used to authenticate this note.

## 2023-04-07 ENCOUNTER — TELEPHONE (OUTPATIENT)
Dept: FAMILY MEDICINE CLINIC | Facility: CLINIC | Age: 28
End: 2023-04-07

## 2023-05-02 ENCOUNTER — TELEMEDICINE (OUTPATIENT)
Dept: FAMILY MEDICINE CLINIC | Facility: CLINIC | Age: 28
End: 2023-05-02
Payer: COMMERCIAL

## 2023-05-02 DIAGNOSIS — L70.0 ACNE VULGARIS: ICD-10-CM

## 2023-05-02 DIAGNOSIS — F90.0 ATTENTION DEFICIT HYPERACTIVITY DISORDER, INATTENTIVE TYPE: ICD-10-CM

## 2023-05-02 DIAGNOSIS — F41.9 ANXIETY DISORDER, UNSPECIFIED TYPE: ICD-10-CM

## 2023-05-02 PROCEDURE — 99213 OFFICE O/P EST LOW 20 MIN: CPT | Performed by: FAMILY MEDICINE

## 2023-05-02 RX ORDER — BUSPIRONE HYDROCHLORIDE 10 MG/1
10 TABLET ORAL 2 TIMES DAILY
Qty: 60 TABLET | Refills: 12 | Status: SHIPPED | OUTPATIENT
Start: 2023-05-02

## 2023-05-02 RX ORDER — HYDROXYZINE HYDROCHLORIDE 25 MG/1
25 TABLET, FILM COATED ORAL NIGHTLY
Qty: 30 TABLET | Refills: 5 | Status: SHIPPED | OUTPATIENT
Start: 2023-05-02 | End: 2023-06-01

## 2023-05-02 RX ORDER — CLINDAMYCIN PHOSPHATE 10 MG/G
GEL TOPICAL
Qty: 60 G | Refills: 2 | Status: SHIPPED | OUTPATIENT
Start: 2023-05-02

## 2023-05-02 RX ORDER — LISDEXAMFETAMINE DIMESYLATE 60 MG/1
60 CAPSULE ORAL DAILY
Qty: 30 CAPSULE | Refills: 0 | Status: SHIPPED | OUTPATIENT
Start: 2023-07-01 | End: 2023-07-31

## 2023-05-02 ASSESSMENT — ENCOUNTER SYMPTOMS
SINUS PAIN: 0
EYE DISCHARGE: 0
COUGH: 0
DIARRHEA: 0
CHEST TIGHTNESS: 0
CONSTIPATION: 0
SHORTNESS OF BREATH: 0
ABDOMINAL PAIN: 0

## 2023-05-02 ASSESSMENT — PATIENT HEALTH QUESTIONNAIRE - PHQ9
2. FEELING DOWN, DEPRESSED OR HOPELESS: 0
SUM OF ALL RESPONSES TO PHQ QUESTIONS 1-9: 0
1. LITTLE INTEREST OR PLEASURE IN DOING THINGS: 0
SUM OF ALL RESPONSES TO PHQ9 QUESTIONS 1 & 2: 0
SUM OF ALL RESPONSES TO PHQ QUESTIONS 1-9: 0

## 2023-05-02 NOTE — PROGRESS NOTES
that this is a billable service, which includes applicable co-pays. This Virtual Visit was conducted with patient's (and/or legal guardian's) consent. Patient identification was verified, and a caregiver was present when appropriate. The patient was located at Home: [address] [place] [address]  Provider was located at [place] [address] ([address] [place] [address]): [phone] [address],  [address] [place] [address] Drive        Total time spent on this encounter:  20min    --Ko Curran MD on 5/2/2023 at 11:14 AM    An electronic signature was used to authenticate this note.

## 2023-10-26 ENCOUNTER — TELEMEDICINE (OUTPATIENT)
Dept: FAMILY MEDICINE CLINIC | Facility: CLINIC | Age: 28
End: 2023-10-26
Payer: COMMERCIAL

## 2023-10-26 DIAGNOSIS — F90.0 ATTENTION DEFICIT HYPERACTIVITY DISORDER, INATTENTIVE TYPE: ICD-10-CM

## 2023-10-26 DIAGNOSIS — F41.9 ANXIETY DISORDER, UNSPECIFIED TYPE: ICD-10-CM

## 2023-10-26 PROCEDURE — 99213 OFFICE O/P EST LOW 20 MIN: CPT | Performed by: FAMILY MEDICINE

## 2023-10-26 RX ORDER — BUSPIRONE HYDROCHLORIDE 10 MG/1
10 TABLET ORAL 2 TIMES DAILY
Qty: 60 TABLET | Refills: 12 | Status: SHIPPED | OUTPATIENT
Start: 2023-10-26

## 2023-10-26 RX ORDER — LORAZEPAM 1 MG/1
1 TABLET ORAL DAILY
Qty: 30 TABLET | Refills: 3 | Status: SHIPPED | OUTPATIENT
Start: 2023-10-26 | End: 2024-02-23

## 2023-10-26 RX ORDER — LISDEXAMFETAMINE DIMESYLATE CAPSULES 60 MG/1
60 CAPSULE ORAL DAILY
Qty: 30 CAPSULE | Refills: 0 | Status: SHIPPED | OUTPATIENT
Start: 2023-10-26 | End: 2023-11-25

## 2023-10-26 RX ORDER — LISDEXAMFETAMINE DIMESYLATE CAPSULES 60 MG/1
60 CAPSULE ORAL DAILY
Qty: 30 CAPSULE | Refills: 0 | Status: SHIPPED | OUTPATIENT
Start: 2023-11-25 | End: 2023-12-25

## 2023-10-26 RX ORDER — LISDEXAMFETAMINE DIMESYLATE CAPSULES 60 MG/1
60 CAPSULE ORAL DAILY
Qty: 30 CAPSULE | Refills: 0 | Status: SHIPPED | OUTPATIENT
Start: 2023-12-24 | End: 2024-01-23

## 2023-10-26 ASSESSMENT — ENCOUNTER SYMPTOMS
COUGH: 0
ABDOMINAL PAIN: 0
SHORTNESS OF BREATH: 0
CONSTIPATION: 0
SINUS PAIN: 0
CHEST TIGHTNESS: 0
EYE DISCHARGE: 0
DIARRHEA: 0

## 2023-10-26 NOTE — PROGRESS NOTES
10/26/2023    TELEHEALTH EVALUATION -- Audio/Visual    HPI:    Alcon Asher (:  1995) has requested an audio/video evaluation for the following concern(s):    Here for recheck of ADD. Doing well on current medication. Sleeping well. Good appetite. Not jittery. No CP or palpitations. Focus has been good. Work doing well. No headaches. Mood has been good. Review of Systems   Constitutional:  Negative for appetite change, fatigue and fever. HENT:  Negative for congestion, ear pain and sinus pain. Eyes:  Negative for discharge. Respiratory:  Negative for cough, chest tightness and shortness of breath. Cardiovascular:  Negative for chest pain, palpitations and leg swelling. Gastrointestinal:  Negative for abdominal pain, constipation and diarrhea. Genitourinary:  Negative for dysuria. Musculoskeletal:  Negative for joint swelling. Skin:  Negative for rash. Neurological:  Negative for headaches. Hematological:  Negative for adenopathy. Psychiatric/Behavioral:  Negative for dysphoric mood. The patient is not nervous/anxious. Prior to Visit Medications    Medication Sig Taking? Authorizing Provider   busPIRone (BUSPAR) 10 MG tablet Take 1 tablet by mouth 2 times daily Yes Patito Lundy MD   Lisdexamfetamine Dimesylate (VYVANSE) 60 MG CAPS Take 60 mg by mouth daily for 30 days. Yes Patito Lundy MD   Lisdexamfetamine Dimesylate 60 MG CAPS Take 60 mg by mouth daily for 30 days. Yes Patito Lundy MD   Lisdexamfetamine Dimesylate 60 MG CAPS Take 60 mg by mouth daily for 30 days. Yes Patito Lundy MD   LORazepam (ATIVAN) 1 MG tablet Take 1 tablet by mouth daily for 120 days.  Max Daily Amount: 1 mg Yes Patito Lundy MD   clindamycin (CLINDAGEL) 1 % gel Use thin film on affected area twice a day  Patito Lundy MD   norgestrel-ethinyl estradiol (LO/OVRAL) 0.3-30 MG-MCG per tablet Take 1 tablet by mouth daily  Patito Lundy MD

## 2024-03-19 ENCOUNTER — OFFICE VISIT (OUTPATIENT)
Dept: FAMILY MEDICINE CLINIC | Facility: CLINIC | Age: 29
End: 2024-03-19
Payer: COMMERCIAL

## 2024-03-19 VITALS
HEIGHT: 68 IN | TEMPERATURE: 97.2 F | HEART RATE: 71 BPM | SYSTOLIC BLOOD PRESSURE: 124 MMHG | OXYGEN SATURATION: 99 % | DIASTOLIC BLOOD PRESSURE: 76 MMHG | BODY MASS INDEX: 32.37 KG/M2 | WEIGHT: 213.6 LBS

## 2024-03-19 DIAGNOSIS — F90.0 ATTENTION DEFICIT HYPERACTIVITY DISORDER, INATTENTIVE TYPE: Primary | ICD-10-CM

## 2024-03-19 DIAGNOSIS — F41.9 ANXIETY DISORDER, UNSPECIFIED TYPE: ICD-10-CM

## 2024-03-19 PROCEDURE — 99213 OFFICE O/P EST LOW 20 MIN: CPT | Performed by: FAMILY MEDICINE

## 2024-03-19 RX ORDER — DEXTROAMPHETAMINE SACCHARATE, AMPHETAMINE ASPARTATE MONOHYDRATE, DEXTROAMPHETAMINE SULFATE AND AMPHETAMINE SULFATE 5; 5; 5; 5 MG/1; MG/1; MG/1; MG/1
20 CAPSULE, EXTENDED RELEASE ORAL EVERY MORNING
Qty: 30 CAPSULE | Refills: 0 | Status: SHIPPED | OUTPATIENT
Start: 2024-06-19 | End: 2024-07-19

## 2024-03-19 RX ORDER — DEXTROAMPHETAMINE SACCHARATE, AMPHETAMINE ASPARTATE MONOHYDRATE, DEXTROAMPHETAMINE SULFATE AND AMPHETAMINE SULFATE 5; 5; 5; 5 MG/1; MG/1; MG/1; MG/1
20 CAPSULE, EXTENDED RELEASE ORAL EVERY MORNING
Qty: 30 CAPSULE | Refills: 0 | Status: SHIPPED | OUTPATIENT
Start: 2024-04-18 | End: 2024-05-18

## 2024-03-19 RX ORDER — LORAZEPAM 1 MG/1
1 TABLET ORAL DAILY
Qty: 30 TABLET | Refills: 3 | Status: SHIPPED | OUTPATIENT
Start: 2024-03-19 | End: 2024-07-17

## 2024-03-19 RX ORDER — DEXTROAMPHETAMINE SACCHARATE, AMPHETAMINE ASPARTATE MONOHYDRATE, DEXTROAMPHETAMINE SULFATE AND AMPHETAMINE SULFATE 5; 5; 5; 5 MG/1; MG/1; MG/1; MG/1
20 CAPSULE, EXTENDED RELEASE ORAL EVERY MORNING
Qty: 30 CAPSULE | Refills: 0 | Status: SHIPPED | OUTPATIENT
Start: 2024-03-19 | End: 2024-04-18

## 2024-03-19 SDOH — ECONOMIC STABILITY: FOOD INSECURITY: WITHIN THE PAST 12 MONTHS, YOU WORRIED THAT YOUR FOOD WOULD RUN OUT BEFORE YOU GOT MONEY TO BUY MORE.: NEVER TRUE

## 2024-03-19 SDOH — ECONOMIC STABILITY: INCOME INSECURITY: HOW HARD IS IT FOR YOU TO PAY FOR THE VERY BASICS LIKE FOOD, HOUSING, MEDICAL CARE, AND HEATING?: NOT VERY HARD

## 2024-03-19 SDOH — ECONOMIC STABILITY: TRANSPORTATION INSECURITY
IN THE PAST 12 MONTHS, HAS LACK OF TRANSPORTATION KEPT YOU FROM MEETINGS, WORK, OR FROM GETTING THINGS NEEDED FOR DAILY LIVING?: NO

## 2024-03-19 SDOH — ECONOMIC STABILITY: FOOD INSECURITY: WITHIN THE PAST 12 MONTHS, THE FOOD YOU BOUGHT JUST DIDN'T LAST AND YOU DIDN'T HAVE MONEY TO GET MORE.: NEVER TRUE

## 2024-03-19 ASSESSMENT — PATIENT HEALTH QUESTIONNAIRE - PHQ9
SUM OF ALL RESPONSES TO PHQ9 QUESTIONS 1 & 2: 0
SUM OF ALL RESPONSES TO PHQ QUESTIONS 1-9: 0
1. LITTLE INTEREST OR PLEASURE IN DOING THINGS: NOT AT ALL
2. FEELING DOWN, DEPRESSED OR HOPELESS: NOT AT ALL
1. LITTLE INTEREST OR PLEASURE IN DOING THINGS: NOT AT ALL
SUM OF ALL RESPONSES TO PHQ QUESTIONS 1-9: 0
SUM OF ALL RESPONSES TO PHQ9 QUESTIONS 1 & 2: 0
2. FEELING DOWN, DEPRESSED OR HOPELESS: NOT AT ALL
SUM OF ALL RESPONSES TO PHQ QUESTIONS 1-9: 0
SUM OF ALL RESPONSES TO PHQ QUESTIONS 1-9: 0

## 2024-03-19 ASSESSMENT — ENCOUNTER SYMPTOMS
SHORTNESS OF BREATH: 0
COUGH: 0
ABDOMINAL PAIN: 0
SINUS PAIN: 0
EYE DISCHARGE: 0
CHEST TIGHTNESS: 0
DIARRHEA: 0

## 2024-03-19 NOTE — PROGRESS NOTES
or posterior oropharyngeal erythema.   Eyes:      Extraocular Movements: Extraocular movements intact.      Conjunctiva/sclera: Conjunctivae normal.   Cardiovascular:      Rate and Rhythm: Normal rate and regular rhythm.      Heart sounds: Normal heart sounds. No murmur heard.  Pulmonary:      Effort: Pulmonary effort is normal.      Breath sounds: Normal breath sounds. No wheezing.   Musculoskeletal:         General: No tenderness.      Right lower leg: No edema.      Left lower leg: No edema.   Lymphadenopathy:      Cervical: No cervical adenopathy.   Skin:     General: Skin is warm and dry.      Findings: No rash.   Neurological:      General: No focal deficit present.      Mental Status: She is alert.   Psychiatric:         Mood and Affect: Mood normal.         Thought Content: Thought content normal.          Assessment and Plan  Sara was seen today for add.    Diagnoses and all orders for this visit:    Attention deficit hyperactivity disorder, inattentive type  -     amphetamine-dextroamphetamine (ADDERALL XR) 20 MG extended release capsule; Take 1 capsule by mouth every morning for 30 days. Max Daily Amount: 20 mg  -     amphetamine-dextroamphetamine (ADDERALL XR) 20 MG extended release capsule; Take 1 capsule by mouth every morning for 30 days. Max Daily Amount: 20 mg  -     amphetamine-dextroamphetamine (ADDERALL XR) 20 MG extended release capsule; Take 1 capsule by mouth every morning for 30 days. Max Daily Amount: 20 mg    Anxiety disorder, unspecified type  -     LORazepam (ATIVAN) 1 MG tablet; Take 1 tablet by mouth daily for 120 days. Max Daily Amount: 1 mg    Other orders  -     norgestrel-ethinyl estradiol (LO/OVRAL) 0.3-30 MG-MCG per tablet; Take 1 tablet by mouth daily    Switch to Adderall due to shortage  Healthy diet/exercise    On this date I have spent 20 minutes reviewing previous notes, test results and face to face with the patient discussing the diagnosis and importance of compliance

## 2024-06-12 ENCOUNTER — OFFICE VISIT (OUTPATIENT)
Dept: FAMILY MEDICINE CLINIC | Facility: CLINIC | Age: 29
End: 2024-06-12
Payer: COMMERCIAL

## 2024-06-12 VITALS
SYSTOLIC BLOOD PRESSURE: 128 MMHG | OXYGEN SATURATION: 100 % | BODY MASS INDEX: 31.4 KG/M2 | WEIGHT: 207.2 LBS | DIASTOLIC BLOOD PRESSURE: 82 MMHG | HEIGHT: 68 IN | TEMPERATURE: 97.8 F | HEART RATE: 85 BPM

## 2024-06-12 DIAGNOSIS — F41.9 ANXIETY DISORDER, UNSPECIFIED TYPE: ICD-10-CM

## 2024-06-12 DIAGNOSIS — T78.40XA ALLERGIC REACTION, INITIAL ENCOUNTER: ICD-10-CM

## 2024-06-12 DIAGNOSIS — F90.0 ATTENTION DEFICIT HYPERACTIVITY DISORDER, INATTENTIVE TYPE: ICD-10-CM

## 2024-06-12 DIAGNOSIS — Z00.00 PREVENTATIVE HEALTH CARE: Primary | ICD-10-CM

## 2024-06-12 DIAGNOSIS — Z12.4 SCREENING FOR MALIGNANT NEOPLASM OF CERVIX: ICD-10-CM

## 2024-06-12 LAB
ALBUMIN SERPL-MCNC: 3.8 G/DL (ref 3.5–5)
ALBUMIN/GLOB SERPL: 1.4 (ref 1–1.9)
ALP SERPL-CCNC: 58 U/L (ref 35–104)
ALT SERPL-CCNC: 20 U/L (ref 12–65)
ANION GAP SERPL CALC-SCNC: 11 MMOL/L (ref 9–18)
AST SERPL-CCNC: 22 U/L (ref 15–37)
BASOPHILS # BLD: 0 K/UL (ref 0–0.2)
BASOPHILS NFR BLD: 0 % (ref 0–2)
BILIRUB SERPL-MCNC: 0.2 MG/DL (ref 0–1.2)
BUN SERPL-MCNC: 10 MG/DL (ref 6–23)
CALCIUM SERPL-MCNC: 9.3 MG/DL (ref 8.8–10.2)
CHLORIDE SERPL-SCNC: 106 MMOL/L (ref 98–107)
CHOLEST SERPL-MCNC: 178 MG/DL (ref 0–200)
CO2 SERPL-SCNC: 24 MMOL/L (ref 20–28)
CREAT SERPL-MCNC: 0.76 MG/DL (ref 0.6–1.1)
DIFFERENTIAL METHOD BLD: NORMAL
EOSINOPHIL # BLD: 0.3 K/UL (ref 0–0.8)
EOSINOPHIL NFR BLD: 4 % (ref 0.5–7.8)
ERYTHROCYTE [DISTWIDTH] IN BLOOD BY AUTOMATED COUNT: 13.2 % (ref 11.9–14.6)
GLOBULIN SER CALC-MCNC: 2.7 G/DL (ref 2.3–3.5)
GLUCOSE SERPL-MCNC: 85 MG/DL (ref 70–99)
HCT VFR BLD AUTO: 44.2 % (ref 35.8–46.3)
HDLC SERPL-MCNC: 47 MG/DL (ref 40–60)
HDLC SERPL: 3.8 (ref 0–5)
HGB BLD-MCNC: 14.4 G/DL (ref 11.7–15.4)
IMM GRANULOCYTES # BLD AUTO: 0 K/UL (ref 0–0.5)
IMM GRANULOCYTES NFR BLD AUTO: 0 % (ref 0–5)
LDLC SERPL CALC-MCNC: 101 MG/DL (ref 0–100)
LYMPHOCYTES # BLD: 2.2 K/UL (ref 0.5–4.6)
LYMPHOCYTES NFR BLD: 25 % (ref 13–44)
MCH RBC QN AUTO: 30.8 PG (ref 26.1–32.9)
MCHC RBC AUTO-ENTMCNC: 32.6 G/DL (ref 31.4–35)
MCV RBC AUTO: 94.4 FL (ref 82–102)
MONOCYTES # BLD: 0.4 K/UL (ref 0.1–1.3)
MONOCYTES NFR BLD: 5 % (ref 4–12)
NEUTS SEG # BLD: 5.8 K/UL (ref 1.7–8.2)
NEUTS SEG NFR BLD: 66 % (ref 43–78)
NRBC # BLD: 0 K/UL (ref 0–0.2)
PLATELET # BLD AUTO: 260 K/UL (ref 150–450)
PMV BLD AUTO: 10.7 FL (ref 9.4–12.3)
POTASSIUM SERPL-SCNC: 3.9 MMOL/L (ref 3.5–5.1)
PROT SERPL-MCNC: 6.6 G/DL (ref 6.3–8.2)
RBC # BLD AUTO: 4.68 M/UL (ref 4.05–5.2)
SODIUM SERPL-SCNC: 141 MMOL/L (ref 136–145)
TRIGL SERPL-MCNC: 147 MG/DL (ref 0–150)
VLDLC SERPL CALC-MCNC: 29 MG/DL (ref 6–23)
WBC # BLD AUTO: 8.8 K/UL (ref 4.3–11.1)

## 2024-06-12 PROCEDURE — 99395 PREV VISIT EST AGE 18-39: CPT | Performed by: FAMILY MEDICINE

## 2024-06-12 RX ORDER — BUSPIRONE HYDROCHLORIDE 10 MG/1
10 TABLET ORAL 2 TIMES DAILY
Qty: 60 TABLET | Refills: 12 | Status: SHIPPED | OUTPATIENT
Start: 2024-06-12

## 2024-06-12 RX ORDER — DEXTROAMPHETAMINE SACCHARATE, AMPHETAMINE ASPARTATE MONOHYDRATE, DEXTROAMPHETAMINE SULFATE AND AMPHETAMINE SULFATE 5; 5; 5; 5 MG/1; MG/1; MG/1; MG/1
20 CAPSULE, EXTENDED RELEASE ORAL EVERY MORNING
Qty: 30 CAPSULE | Refills: 0 | Status: SHIPPED | OUTPATIENT
Start: 2024-07-12 | End: 2024-08-11

## 2024-06-12 RX ORDER — DEXTROAMPHETAMINE SACCHARATE, AMPHETAMINE ASPARTATE MONOHYDRATE, DEXTROAMPHETAMINE SULFATE AND AMPHETAMINE SULFATE 5; 5; 5; 5 MG/1; MG/1; MG/1; MG/1
20 CAPSULE, EXTENDED RELEASE ORAL EVERY MORNING
Qty: 30 CAPSULE | Refills: 0 | Status: SHIPPED | OUTPATIENT
Start: 2024-08-11 | End: 2024-09-10

## 2024-06-12 RX ORDER — DEXTROAMPHETAMINE SACCHARATE, AMPHETAMINE ASPARTATE MONOHYDRATE, DEXTROAMPHETAMINE SULFATE AND AMPHETAMINE SULFATE 5; 5; 5; 5 MG/1; MG/1; MG/1; MG/1
20 CAPSULE, EXTENDED RELEASE ORAL EVERY MORNING
Qty: 30 CAPSULE | Refills: 0 | Status: SHIPPED | OUTPATIENT
Start: 2024-06-12 | End: 2024-06-12 | Stop reason: SDUPTHER

## 2024-06-12 RX ORDER — DEXTROAMPHETAMINE SACCHARATE, AMPHETAMINE ASPARTATE MONOHYDRATE, DEXTROAMPHETAMINE SULFATE AND AMPHETAMINE SULFATE 5; 5; 5; 5 MG/1; MG/1; MG/1; MG/1
20 CAPSULE, EXTENDED RELEASE ORAL EVERY MORNING
Qty: 30 CAPSULE | Refills: 0 | Status: SHIPPED | OUTPATIENT
Start: 2024-06-19 | End: 2024-06-12 | Stop reason: SDUPTHER

## 2024-06-12 RX ORDER — LORAZEPAM 1 MG/1
1 TABLET ORAL DAILY
Qty: 30 TABLET | Refills: 3 | Status: SHIPPED | OUTPATIENT
Start: 2024-06-12 | End: 2024-10-10

## 2024-06-12 RX ORDER — DEXTROAMPHETAMINE SACCHARATE, AMPHETAMINE ASPARTATE MONOHYDRATE, DEXTROAMPHETAMINE SULFATE AND AMPHETAMINE SULFATE 5; 5; 5; 5 MG/1; MG/1; MG/1; MG/1
20 CAPSULE, EXTENDED RELEASE ORAL EVERY MORNING
Qty: 30 CAPSULE | Refills: 0 | Status: SHIPPED | OUTPATIENT
Start: 2024-06-12 | End: 2024-07-12

## 2024-06-12 ASSESSMENT — ENCOUNTER SYMPTOMS
SINUS PAIN: 0
ABDOMINAL PAIN: 0
COUGH: 0
DIARRHEA: 0
CHEST TIGHTNESS: 0
SHORTNESS OF BREATH: 0
EYE DISCHARGE: 0
CONSTIPATION: 0

## 2024-06-12 NOTE — PROGRESS NOTES
Sara Van is a 28 y.o. female who presents with   Chief Complaint   Patient presents with    Annual Exam    Allergic Reaction     Unknown item, hives/rash, feverish, n/v. Did bathe in epsom salt with elderberry       History of Present Illness    Here for cpx.  Periods regular.  Some increased cramping last couple months.  Mood good. Anxiety controlled. Here for recheck of ADD.  Doing well on current medication.  Sleeping well.  Good appetite. Not jittery.  No CP or palpitations.  Focus has been good.   No headaches.  Mood has been good. Hives earlier this week.  No known cause.  Had bath with Elderberry and had eaten egg sandwich at work at [employer].  No wheezing.  FH colon cancer in dad and breast cancer on father's side.     Review of Systems  Review of Systems   Constitutional:  Negative for appetite change, fatigue and fever.   HENT:  Negative for congestion, ear pain and sinus pain.    Eyes:  Negative for discharge.   Respiratory:  Negative for cough, chest tightness and shortness of breath.    Cardiovascular:  Negative for chest pain, palpitations and leg swelling.   Gastrointestinal:  Negative for abdominal pain, constipation and diarrhea.   Genitourinary:  Negative for dysuria.   Musculoskeletal:  Negative for joint swelling.   Skin:  Negative for rash.   Neurological:  Negative for headaches.   Hematological:  Negative for adenopathy.   Psychiatric/Behavioral:  Negative for dysphoric mood. The patient is not nervous/anxious.         Medications  Current Outpatient Medications   Medication Sig Dispense Refill    amphetamine-dextroamphetamine (ADDERALL XR) 20 MG extended release capsule Take 1 capsule by mouth every morning for 30 days. Max Daily Amount: 20 mg 30 capsule 0    amphetamine-dextroamphetamine (ADDERALL XR) 20 MG extended release capsule Take 1 capsule by mouth every morning for 30 days. Max Daily Amount: 20 mg 30 capsule 0    [START ON 6/19/2024] amphetamine-dextroamphetamine (ADDERALL

## 2024-06-21 LAB
COLLECTION METHOD: NORMAL
CYTOLOGIST CVX/VAG CYTO: NORMAL
CYTOLOGY CVX/VAG DOC THIN PREP: NORMAL
DATE OF LMP: NORMAL
HPV REFLEX: NORMAL
Lab: NORMAL
Lab: NORMAL
PAP SOURCE: NORMAL
PATH REPORT.FINAL DX SPEC: NORMAL
STAT OF ADQ CVX/VAG CYTO-IMP: NORMAL

## 2024-09-12 ENCOUNTER — TELEMEDICINE (OUTPATIENT)
Dept: FAMILY MEDICINE CLINIC | Facility: CLINIC | Age: 29
End: 2024-09-12
Payer: COMMERCIAL

## 2024-09-12 DIAGNOSIS — F41.9 ANXIETY DISORDER, UNSPECIFIED TYPE: ICD-10-CM

## 2024-09-12 DIAGNOSIS — F90.0 ATTENTION DEFICIT HYPERACTIVITY DISORDER, INATTENTIVE TYPE: ICD-10-CM

## 2024-09-12 PROCEDURE — 99214 OFFICE O/P EST MOD 30 MIN: CPT | Performed by: NURSE PRACTITIONER

## 2024-09-12 RX ORDER — DEXTROAMPHETAMINE SACCHARATE, AMPHETAMINE ASPARTATE MONOHYDRATE, DEXTROAMPHETAMINE SULFATE AND AMPHETAMINE SULFATE 5; 5; 5; 5 MG/1; MG/1; MG/1; MG/1
20 CAPSULE, EXTENDED RELEASE ORAL EVERY MORNING
Qty: 30 CAPSULE | Refills: 0 | Status: CANCELLED | OUTPATIENT
Start: 2024-10-12 | End: 2024-11-11

## 2024-09-12 RX ORDER — DEXTROAMPHETAMINE SACCHARATE, AMPHETAMINE ASPARTATE MONOHYDRATE, DEXTROAMPHETAMINE SULFATE AND AMPHETAMINE SULFATE 7.5; 7.5; 7.5; 7.5 MG/1; MG/1; MG/1; MG/1
30 CAPSULE, EXTENDED RELEASE ORAL DAILY
Qty: 30 CAPSULE | Refills: 0 | Status: SHIPPED | OUTPATIENT
Start: 2024-09-12 | End: 2024-10-12

## 2024-09-12 RX ORDER — LORAZEPAM 1 MG/1
1 TABLET ORAL DAILY
Qty: 30 TABLET | Refills: 3 | Status: CANCELLED | OUTPATIENT
Start: 2024-09-12 | End: 2025-01-10

## 2024-09-12 RX ORDER — LORAZEPAM 1 MG/1
1 TABLET ORAL 2 TIMES DAILY PRN
Qty: 45 TABLET | Refills: 3 | Status: SHIPPED | OUTPATIENT
Start: 2024-09-12 | End: 2025-01-10

## 2024-09-12 RX ORDER — DEXTROAMPHETAMINE SACCHARATE, AMPHETAMINE ASPARTATE MONOHYDRATE, DEXTROAMPHETAMINE SULFATE AND AMPHETAMINE SULFATE 7.5; 7.5; 7.5; 7.5 MG/1; MG/1; MG/1; MG/1
30 CAPSULE, EXTENDED RELEASE ORAL DAILY
Qty: 30 CAPSULE | Refills: 0 | Status: SHIPPED | OUTPATIENT
Start: 2024-10-12 | End: 2024-11-11

## 2024-09-12 RX ORDER — ALBUTEROL SULFATE 90 UG/1
2 AEROSOL, METERED RESPIRATORY (INHALATION) EVERY 4 HOURS PRN
COMMUNITY

## 2024-09-12 RX ORDER — DEXTROAMPHETAMINE SACCHARATE, AMPHETAMINE ASPARTATE MONOHYDRATE, DEXTROAMPHETAMINE SULFATE AND AMPHETAMINE SULFATE 7.5; 7.5; 7.5; 7.5 MG/1; MG/1; MG/1; MG/1
30 CAPSULE, EXTENDED RELEASE ORAL DAILY
Qty: 30 CAPSULE | Refills: 0 | Status: SHIPPED | OUTPATIENT
Start: 2024-11-11 | End: 2024-12-11

## 2024-09-12 RX ORDER — DEXTROAMPHETAMINE SACCHARATE, AMPHETAMINE ASPARTATE MONOHYDRATE, DEXTROAMPHETAMINE SULFATE AND AMPHETAMINE SULFATE 5; 5; 5; 5 MG/1; MG/1; MG/1; MG/1
20 CAPSULE, EXTENDED RELEASE ORAL EVERY MORNING
Qty: 30 CAPSULE | Refills: 0 | Status: CANCELLED | OUTPATIENT
Start: 2024-09-12 | End: 2024-10-12

## 2024-09-12 RX ORDER — DEXTROAMPHETAMINE SACCHARATE, AMPHETAMINE ASPARTATE MONOHYDRATE, DEXTROAMPHETAMINE SULFATE AND AMPHETAMINE SULFATE 5; 5; 5; 5 MG/1; MG/1; MG/1; MG/1
20 CAPSULE, EXTENDED RELEASE ORAL EVERY MORNING
Qty: 30 CAPSULE | Refills: 0 | Status: CANCELLED | OUTPATIENT
Start: 2024-11-11 | End: 2024-12-11

## 2024-09-12 ASSESSMENT — ENCOUNTER SYMPTOMS
WHEEZING: 0
COLOR CHANGE: 0
SHORTNESS OF BREATH: 0
CHEST TIGHTNESS: 0
COUGH: 0
GASTROINTESTINAL NEGATIVE: 1

## 2025-06-06 ENCOUNTER — OFFICE VISIT (OUTPATIENT)
Dept: FAMILY MEDICINE CLINIC | Facility: CLINIC | Age: 30
End: 2025-06-06

## 2025-06-06 VITALS
OXYGEN SATURATION: 98 % | TEMPERATURE: 97.7 F | HEART RATE: 99 BPM | DIASTOLIC BLOOD PRESSURE: 84 MMHG | WEIGHT: 249 LBS | BODY MASS INDEX: 37.86 KG/M2 | SYSTOLIC BLOOD PRESSURE: 124 MMHG

## 2025-06-06 DIAGNOSIS — F41.9 ANXIETY DISORDER, UNSPECIFIED TYPE: ICD-10-CM

## 2025-06-06 DIAGNOSIS — F90.0 ATTENTION DEFICIT HYPERACTIVITY DISORDER, INATTENTIVE TYPE: Primary | ICD-10-CM

## 2025-06-06 DIAGNOSIS — F51.01 PRIMARY INSOMNIA: ICD-10-CM

## 2025-06-06 PROCEDURE — 99213 OFFICE O/P EST LOW 20 MIN: CPT | Performed by: FAMILY MEDICINE

## 2025-06-06 RX ORDER — DEXTROAMPHETAMINE SACCHARATE, AMPHETAMINE ASPARTATE, DEXTROAMPHETAMINE SULFATE AND AMPHETAMINE SULFATE 5; 5; 5; 5 MG/1; MG/1; MG/1; MG/1
20 TABLET ORAL DAILY
Qty: 30 TABLET | Refills: 0 | Status: SHIPPED | OUTPATIENT
Start: 2025-06-06 | End: 2025-07-06

## 2025-06-06 RX ORDER — LORAZEPAM 1 MG/1
1 TABLET ORAL 2 TIMES DAILY PRN
Qty: 45 TABLET | Refills: 3 | Status: CANCELLED | OUTPATIENT
Start: 2025-06-06 | End: 2025-10-04

## 2025-06-06 RX ORDER — DEXTROAMPHETAMINE SACCHARATE, AMPHETAMINE ASPARTATE, DEXTROAMPHETAMINE SULFATE AND AMPHETAMINE SULFATE 5; 5; 5; 5 MG/1; MG/1; MG/1; MG/1
20 TABLET ORAL DAILY
Qty: 30 TABLET | Refills: 0 | Status: SHIPPED | OUTPATIENT
Start: 2025-07-06 | End: 2025-08-05

## 2025-06-06 RX ORDER — ESZOPICLONE 3 MG/1
3 TABLET, FILM COATED ORAL NIGHTLY
Qty: 30 TABLET | Refills: 5 | Status: SHIPPED | OUTPATIENT
Start: 2025-06-06 | End: 2025-12-03

## 2025-06-06 RX ORDER — BUSPIRONE HYDROCHLORIDE 10 MG/1
10 TABLET ORAL 2 TIMES DAILY
Qty: 60 TABLET | Refills: 12 | Status: SHIPPED | OUTPATIENT
Start: 2025-06-06

## 2025-06-06 RX ORDER — DEXTROAMPHETAMINE SACCHARATE, AMPHETAMINE ASPARTATE, DEXTROAMPHETAMINE SULFATE AND AMPHETAMINE SULFATE 5; 5; 5; 5 MG/1; MG/1; MG/1; MG/1
20 TABLET ORAL DAILY
Qty: 30 TABLET | Refills: 0 | Status: SHIPPED | OUTPATIENT
Start: 2025-08-05 | End: 2025-09-04

## 2025-06-06 SDOH — ECONOMIC STABILITY: FOOD INSECURITY: WITHIN THE PAST 12 MONTHS, YOU WORRIED THAT YOUR FOOD WOULD RUN OUT BEFORE YOU GOT MONEY TO BUY MORE.: PATIENT DECLINED

## 2025-06-06 SDOH — ECONOMIC STABILITY: INCOME INSECURITY: IN THE LAST 12 MONTHS, WAS THERE A TIME WHEN YOU WERE NOT ABLE TO PAY THE MORTGAGE OR RENT ON TIME?: NO

## 2025-06-06 SDOH — ECONOMIC STABILITY: FOOD INSECURITY: WITHIN THE PAST 12 MONTHS, THE FOOD YOU BOUGHT JUST DIDN'T LAST AND YOU DIDN'T HAVE MONEY TO GET MORE.: PATIENT DECLINED

## 2025-06-06 SDOH — ECONOMIC STABILITY: TRANSPORTATION INSECURITY
IN THE PAST 12 MONTHS, HAS THE LACK OF TRANSPORTATION KEPT YOU FROM MEDICAL APPOINTMENTS OR FROM GETTING MEDICATIONS?: NO

## 2025-06-06 ASSESSMENT — PATIENT HEALTH QUESTIONNAIRE - PHQ9
SUM OF ALL RESPONSES TO PHQ QUESTIONS 1-9: 2
1. LITTLE INTEREST OR PLEASURE IN DOING THINGS: SEVERAL DAYS
SUM OF ALL RESPONSES TO PHQ QUESTIONS 1-9: 2
2. FEELING DOWN, DEPRESSED OR HOPELESS: SEVERAL DAYS
SUM OF ALL RESPONSES TO PHQ QUESTIONS 1-9: 2
SUM OF ALL RESPONSES TO PHQ QUESTIONS 1-9: 2
2. FEELING DOWN, DEPRESSED OR HOPELESS: SEVERAL DAYS
SUM OF ALL RESPONSES TO PHQ9 QUESTIONS 1 & 2: 2
1. LITTLE INTEREST OR PLEASURE IN DOING THINGS: SEVERAL DAYS

## 2025-06-06 ASSESSMENT — ENCOUNTER SYMPTOMS
DIARRHEA: 0
CHEST TIGHTNESS: 0
COUGH: 0
CONSTIPATION: 0
SHORTNESS OF BREATH: 0
SINUS PAIN: 0
ABDOMINAL PAIN: 0
EYE DISCHARGE: 0

## 2025-06-06 NOTE — PROGRESS NOTES
ON 2025] amphetamine-dextroamphetamine (ADDERALL, 20MG,) 20 MG tablet Take 1 tablet by mouth daily for 30 days. Max Daily Amount: 20 mg 30 tablet 0    norgestrel-ethinyl estradiol (LO/OVRAL) 0.3-30 MG-MCG per tablet Take 1 tablet by mouth daily 3 packet 1    albuterol sulfate HFA (PROVENTIL;VENTOLIN;PROAIR) 108 (90 Base) MCG/ACT inhaler Inhale 2 puffs into the lungs every 4 hours as needed for Wheezing or Shortness of Breath      amphetamine-dextroamphetamine (ADDERALL XR) 30 MG extended release capsule Take 1 capsule by mouth daily for 30 days. Max Daily Amount: 30 mg 30 capsule 0    amphetamine-dextroamphetamine (ADDERALL XR) 30 MG extended release capsule Take 1 capsule by mouth daily for 30 days. Max Daily Amount: 30 mg 30 capsule 0    amphetamine-dextroamphetamine (ADDERALL XR) 30 MG extended release capsule Take 1 capsule by mouth daily for 30 days. Max Daily Amount: 30 mg 30 capsule 0     No current facility-administered medications for this visit.        Past Medical History  Past Medical History:   Diagnosis Date    Acne     ADD (attention deficit disorder)     Anxiety        Surgical History  Past Surgical History:   Procedure Laterality Date    WISDOM TOOTH EXTRACTION  8-26-15          Family History  Family History   Problem Relation Age of Onset    No Known Problems Mother     No Known Problems Father     No Known Problems Sister     No Known Problems Brother        Social History  Social History     Socioeconomic History    Marital status: Single     Spouse name: Not on file    Number of children: Not on file    Years of education: Not on file    Highest education level: Not on file   Occupational History    Not on file   Tobacco Use    Smoking status: Former     Current packs/day: 0.00     Types: Cigarettes     Quit date: 2017     Years since quittin.7    Smokeless tobacco: Never    Tobacco comments:     Quit smoking: once monthly   Vaping Use    Vaping status: Never Used   Substance and

## 2025-08-11 ENCOUNTER — TELEPHONE (OUTPATIENT)
Dept: FAMILY MEDICINE CLINIC | Facility: CLINIC | Age: 30
End: 2025-08-11